# Patient Record
Sex: MALE | Race: WHITE | NOT HISPANIC OR LATINO | Employment: FULL TIME | URBAN - NONMETROPOLITAN AREA
[De-identification: names, ages, dates, MRNs, and addresses within clinical notes are randomized per-mention and may not be internally consistent; named-entity substitution may affect disease eponyms.]

---
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
## 2018-01-04 ENCOUNTER — OFFICE VISIT (OUTPATIENT)
Dept: PSYCHIATRY | Facility: CLINIC | Age: 18
End: 2018-01-04

## 2018-01-04 VITALS — BODY MASS INDEX: 32.07 KG/M2 | HEIGHT: 70 IN | WEIGHT: 224 LBS

## 2018-01-04 DIAGNOSIS — F90.2 ATTENTION DEFICIT HYPERACTIVITY DISORDER, COMBINED TYPE: Primary | ICD-10-CM

## 2018-01-04 DIAGNOSIS — F51.05 INSOMNIA DUE TO MENTAL CONDITION: ICD-10-CM

## 2018-01-04 PROCEDURE — 99214 OFFICE O/P EST MOD 30 MIN: CPT | Performed by: NURSE PRACTITIONER

## 2018-01-04 NOTE — PROGRESS NOTES
Subjective   Andrew Martinez is a 17 y.o. male who is here today for medication management follow up.    Chief Complaint: ADHD combined type     History of Present Illness Paitient presents by himself for med management. He had full evaluation by Martha Joe [facility] in [place] [facility] however referred to me for med management as he lives in [place] for school at [employer]. He reports he is making poor grades and is trying so hard to do well.  Now he is getting into more difficult classes and he finds it hard to focus, is easily distracted, is impulsive and lets things get overwhelming to him and can't finish papers or assignments in reasonable amount of time.  For at least the past 6 months he reports problems getting things in order, difficulty paying attention when bored, and becoming easily distracted by external stimuli.  He also finds himself delaying getting started on things that require thought, fidgeting when he has to sit still for long periods, misplacing  things and having difficulty remembering appointments and obligations.  He finds this difficulty with focus and concentration concerning because he would like to be successful in his career choice.  His score was positive for ADD on the ADHD screener that Martha did.  His urine drug screen was negative. Patient is tearful discussing his frustrations and inability to do well in college. He had difficulty in elementary school and then in middle school got into sports and made grades well enough to stay in sports. High school wasn't hard but he just made by and is surprised he got into college. He reports a 2.9 GPA last semester. He struggles with a diagnosis because his dad whom he is closest to doesn't believe in ADHD and tells him he just needs to focus, this is upsetting to patient because he tries, he shuts off his telephone, goes to a quiet place and still struggles to read without his mind drifting and can't seem to comprehend what he does read.  "He denies SI/HI or AVH, denies abuse history denies illicit drug use or tobacco products, drinks lightly socially. He reports being nervous about taking medication and denies trying someone's adderall or other stimulant. He is asking good questions about medications and how they will effect him or adverse effects. He has good relationship with friends and family. He reports his mother has difficulties with depression. He works on Sundays but no other day to give him full study time for school. In classroom he tries to focus and write notes but drifts and hard to sit long in class. He is fidgeting during session with paper in his hand and adjusting his position often. He reports sleep can be difficult because his mind doesn't want to shut off. He denies PTSD, denies OCD or yesenia or depression. I'm frustrated and can worry but usually a pretty happy go reji waqas corbin he states. Denies suicidal attempts or self harming ever.       The following portions of the patient's history were reviewed and updated as appropriate: allergies, current medications, past family history, past medical history, past social history, past surgical history and problem list.    Review of Systems   Constitutional: Negative.    HENT: Negative.    Eyes: Negative.    Respiratory: Negative.    Cardiovascular: Negative.    Gastrointestinal: Negative.    Endocrine: Negative.    Genitourinary: Negative.    Musculoskeletal: Negative.    Skin: Negative.    Allergic/Immunologic: Negative.    Neurological: Negative.    Hematological: Negative.    Psychiatric/Behavioral: Positive for decreased concentration. The patient is nervous/anxious.    denies fever, cough, s/s’s of infection, denies GI/ problems, denies new medical issues     Objective   Physical Exam  Height 177.8 cm (70\"), weight 102 kg (224 lb).    No Known Allergies    Current Medications:   Current Outpatient Prescriptions   Medication Sig Dispense Refill   • amoxicillin-clavulanate " (AUGMENTIN) 875-125 MG per tablet Take 1 tablet by mouth 2 (Two) Times a Day. 20 tablet 0   • lisdexamfetamine (VYVANSE) 20 MG capsule Take 1 capsule by mouth Every Morning 7 capsule 0   • lisdexamfetamine dimesylate (VYVANSE) 10 MG capsule Take 1 capsule by mouth Daily 7 capsule 0   • PredniSONE (DELTASONE) 10 MG (21) tablet pack Daily taper- 6/5/4/3/2/1 21 tablet 0     No current facility-administered medications for this visit.        Mental Status Exam:   Appearance: appropriate  Hygiene:   good  Cooperation:  Cooperative  Eye Contact:  Good  Psychomotor Behavior:  Appropriate  Mood:  within normal limits  Affect:  Appropriate  Hopelessness: Denies  Speech:  Normal  Thought Process:  Linear  Thought Content:  Normal  Suicidal:  None  Homicidal:  None  Hallucinations:  None  Delusion:  None  Memory:  Intact  Orientation:  Person, Place, Time and Situation  Reliability:  good  Insight:  Good  Judgement:  Good  Impulse Control:  Good  Estimated Intelligence: average range   Physical/Medical Issues:  No     Assessment/Plan   Diagnoses and all orders for this visit:    Attention deficit hyperactivity disorder, combined type    Insomnia due to mental condition    Other orders  -     lisdexamfetamine dimesylate (VYVANSE) 10 MG capsule; Take 1 capsule by mouth Daily  -     lisdexamfetamine (VYVANSE) 20 MG capsule; Take 1 capsule by mouth Every Morning    25 minutes     A psychological evaluation was conducted in order to assess past and current level of functioning. Areas assessed included, but were not limited to: perception of social support, perception of ability to face and deal with challenges in life (positive functioning), anxiety symptoms, depressive symptoms, perspective on beliefs/belief system, coping skills for stress, intelligence level,  Therapeutic rapport was established. Interventions conducted today were geared towards incorporating medication management along with support for continued therapy.  Education was also provided as to the med management with this provider and what to expect in subsequent sessions.    discussed ADHD at length, discussing treatment options, therapy, , behavioral modification, ADHD resources given to pt and talked with him about them, medication management discussed with non stimulants and stimulants   Will trial on Vyvanse 10 mg PO one Q AM #7 then increase to 20 mg PO one QD x 7 days then he will call and let me know how he is doing with ADHD symptoms. F/u in 3 weeks.   We discussed risks, benefits, and side effects of the above medications and the patient was agreeable with the plan. Patient was educated on the importance of compliance with treatment and follow-up appointments.   Controlled substance policy reviewed and prescriptions are either  printed off for patient, telephoned in  or ordered through [facility] by this provider  Instructed to call for questions or concerns and return early if necessary. Crisis plan reviewed including going to the Emergency department.    Return in about 3 weeks (around 1/25/2018).

## 2018-01-18 RX ORDER — DEXTROAMPHETAMINE SACCHARATE, AMPHETAMINE ASPARTATE MONOHYDRATE, DEXTROAMPHETAMINE SULFATE AND AMPHETAMINE SULFATE 5; 5; 5; 5 MG/1; MG/1; MG/1; MG/1
20 CAPSULE, EXTENDED RELEASE ORAL EVERY MORNING
Qty: 30 CAPSULE | Refills: 0 | Status: SHIPPED | OUTPATIENT
Start: 2018-01-18 | End: 2018-02-19 | Stop reason: SDUPTHER

## 2018-01-25 ENCOUNTER — OFFICE VISIT (OUTPATIENT)
Dept: PSYCHIATRY | Facility: CLINIC | Age: 18
End: 2018-01-25

## 2018-01-25 VITALS — WEIGHT: 224 LBS | BODY MASS INDEX: 32.07 KG/M2 | HEIGHT: 70 IN

## 2018-01-25 DIAGNOSIS — F90.2 ATTENTION DEFICIT HYPERACTIVITY DISORDER, COMBINED TYPE: Primary | ICD-10-CM

## 2018-01-25 PROCEDURE — 99213 OFFICE O/P EST LOW 20 MIN: CPT | Performed by: NURSE PRACTITIONER

## 2018-01-25 RX ORDER — DEXTROAMPHETAMINE SACCHARATE, AMPHETAMINE ASPARTATE, DEXTROAMPHETAMINE SULFATE AND AMPHETAMINE SULFATE 2.5; 2.5; 2.5; 2.5 MG/1; MG/1; MG/1; MG/1
TABLET ORAL
Qty: 20 TABLET | Refills: 0 | Status: SHIPPED | OUTPATIENT
Start: 2018-01-25 | End: 2018-01-29 | Stop reason: SDUPTHER

## 2018-01-25 NOTE — PROGRESS NOTES
"    Subjective   Andrew Octaviano Martinez is a 17 y.o. male who is here today for medication management follow up.    Chief Complaint: Diagnoses and all orders for this visit:    Attention deficit hyperactivity disorder, combined type          History of Present Illness Patient presents by himself for f/u. 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 He is sleeping much better now , denies racing thoughts and isn't feeling all nervous and anxious now that he can study and comprehend. He has classes all day so has to take the XR at 7a for 8am class so feels focus and very tired at 3pm. Then he struggles with focus and concentration. His appetite is good denies issues there, mood is good.     (Scales based on 0 - 10 with 10 being the worst)        The following portions of the patient's history were reviewed and updated as appropriate: allergies, current medications, past family history, past medical history, past social history, past surgical history and problem list.    Review of Systemsdenies fever, cough, s/s’s of infection, denies GI/ problems, denies new medical issues     Objective   Physical Exam  Height 177.8 cm (70\"), weight 102 kg (224 lb).    No Known Allergies    Current Medications:   Current Outpatient Prescriptions   Medication Sig Dispense Refill   • amoxicillin-clavulanate (AUGMENTIN) 875-125 MG per tablet Take 1 tablet by mouth 2 (Two) Times a Day. 20 tablet 0   • amphetamine-dextroamphetamine (ADDERALL) 10 MG tablet Take as needed in afternoon for study 20 tablet 0   • amphetamine-dextroamphetamine XR (ADDERALL XR) 20 MG 24 hr capsule Take 1 capsule by mouth Every Morning 30 capsule 0   • PredniSONE (DELTASONE) 10 MG (21) tablet pack Daily taper- 6/5/4/3/2/1 21 tablet 0     No current facility-administered medications for this visit.      Appearance: " appropriate  Hygiene:   good  Cooperation:  Cooperative  Eye Contact:  Good  Psychomotor Behavior:  Appropriate  Mood:  within normal limits  Affect:  Appropriate  Hopelessness: Denies  Speech:  Normal  Thought Process:  Linear  Thought Content:  Normal  Suicidal:  None  Homicidal:  None  Hallucinations:  None  Delusion:  None  Memory:  Intact  Orientation:  Person, Place, Time and Situation  Reliability:  good  Insight:  good  Judgement:  good  Impulse Control good  Estimated Intelligence: average range   Physical/Medical Issues:  No        Assessment/Plan   Diagnoses and all orders for this visit:    Attention deficit hyperactivity disorder, combined type    Other orders  -     amphetamine-dextroamphetamine (ADDERALL) 10 MG tablet; Take as needed in afternoon for study      Cont Adderall XR 20mg PO one QAM doesn't need refill yet  Add Adderall IR 10mg for afternoon studying as needed #20, NR   ·   Anxiety worry down, sleeping well , denies adverse effects     We discussed risks, benefits, and side effects of the above medications and the patient was agreeable with the plan. Patient was educated on the importance of compliance with treatment and follow-up appointments.   Controlled substance prescriptions are either  printed off for patient, telephoned in  or ordered through [facility] by this provider  Instructed to call for questions or concerns and return early if necessary. Crisis plan reviewed including going to the Emergency department.    Return in about 8 weeks (around 3/22/2018).         Please note that portions of this note were completed with a voice recognition program.  Efforts were made to edit dictation, but occasionally words are mistranscribed.

## 2018-01-29 RX ORDER — DEXTROAMPHETAMINE SACCHARATE, AMPHETAMINE ASPARTATE, DEXTROAMPHETAMINE SULFATE AND AMPHETAMINE SULFATE 2.5; 2.5; 2.5; 2.5 MG/1; MG/1; MG/1; MG/1
TABLET ORAL
Qty: 20 TABLET | Refills: 0
Start: 2018-01-29 | End: 2018-03-08 | Stop reason: SDUPTHER

## 2018-02-19 RX ORDER — DEXTROAMPHETAMINE SACCHARATE, AMPHETAMINE ASPARTATE MONOHYDRATE, DEXTROAMPHETAMINE SULFATE AND AMPHETAMINE SULFATE 5; 5; 5; 5 MG/1; MG/1; MG/1; MG/1
20 CAPSULE, EXTENDED RELEASE ORAL EVERY MORNING
Qty: 30 CAPSULE | Refills: 0 | Status: SHIPPED | OUTPATIENT
Start: 2018-02-19 | End: 2018-03-22 | Stop reason: SDUPTHER

## 2018-03-08 RX ORDER — DEXTROAMPHETAMINE SACCHARATE, AMPHETAMINE ASPARTATE, DEXTROAMPHETAMINE SULFATE AND AMPHETAMINE SULFATE 2.5; 2.5; 2.5; 2.5 MG/1; MG/1; MG/1; MG/1
TABLET ORAL
Qty: 20 TABLET | Refills: 0 | Status: SHIPPED | OUTPATIENT
Start: 2018-03-08 | End: 2018-03-22 | Stop reason: SDUPTHER

## 2018-03-22 ENCOUNTER — OFFICE VISIT (OUTPATIENT)
Dept: PSYCHIATRY | Facility: CLINIC | Age: 18
End: 2018-03-22

## 2018-03-22 VITALS
WEIGHT: 217 LBS | HEIGHT: 70 IN | SYSTOLIC BLOOD PRESSURE: 128 MMHG | BODY MASS INDEX: 31.07 KG/M2 | DIASTOLIC BLOOD PRESSURE: 78 MMHG

## 2018-03-22 DIAGNOSIS — F90.2 ATTENTION DEFICIT HYPERACTIVITY DISORDER, COMBINED TYPE: Primary | ICD-10-CM

## 2018-03-22 DIAGNOSIS — F51.05 INSOMNIA DUE TO MENTAL CONDITION: ICD-10-CM

## 2018-03-22 DIAGNOSIS — F41.1 GENERALIZED ANXIETY DISORDER: ICD-10-CM

## 2018-03-22 PROCEDURE — 99213 OFFICE O/P EST LOW 20 MIN: CPT | Performed by: NURSE PRACTITIONER

## 2018-03-22 RX ORDER — DEXTROAMPHETAMINE SACCHARATE, AMPHETAMINE ASPARTATE, DEXTROAMPHETAMINE SULFATE AND AMPHETAMINE SULFATE 2.5; 2.5; 2.5; 2.5 MG/1; MG/1; MG/1; MG/1
TABLET ORAL
Qty: 20 TABLET | Refills: 0 | Status: SHIPPED | OUTPATIENT
Start: 2018-03-22 | End: 2018-04-30 | Stop reason: SDUPTHER

## 2018-03-22 RX ORDER — DEXTROAMPHETAMINE SACCHARATE, AMPHETAMINE ASPARTATE MONOHYDRATE, DEXTROAMPHETAMINE SULFATE AND AMPHETAMINE SULFATE 5; 5; 5; 5 MG/1; MG/1; MG/1; MG/1
20 CAPSULE, EXTENDED RELEASE ORAL EVERY MORNING
Qty: 30 CAPSULE | Refills: 0 | Status: SHIPPED | OUTPATIENT
Start: 2018-03-22 | End: 2018-04-30 | Stop reason: SDUPTHER

## 2018-03-22 NOTE — PROGRESS NOTES
"    Subjective   Andrew Octaviano Martinez is a 17 y.o. male who is here today for medication management follow up.    Chief Complaint:     Attention deficit hyperactivity disorder, combined type    Insomnia due to mental condition    Generalized anxiety disorder        History of Present Illness Patient presents by himself for f/u for ADHD. 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 He denies adverse effects from medications. He has longer days with pre clinical skills day and the T and Th late classes. Focus falls off in afternoons. Otherwise getting a's, b's, and one c. He had been failing classes previous treatment for ADHD, sleeping well and eating well  . Patient reports other than stress of school his anxiety is way down able to academically perform well.  Denies adverse effects from medications.   (Scales based on 0 - 10 with 10 being the worst)        The following portions of the patient's history were reviewed and updated as appropriate: allergies, current medications, past family history, past medical history, past social history, past surgical history and problem list.    Review of Systems  denies fever, cough, s/s’s of infection, denies GI/ problems, denies new medical issues     Objective   Physical Exam  Blood pressure 128/78, height 177.8 cm (70\"), weight 98.4 kg (217 lb).    No Known Allergies    Current Medications:   Current Outpatient Prescriptions   Medication Sig Dispense Refill   • amphetamine-dextroamphetamine (ADDERALL) 10 MG tablet Take one tablet as needed in afternoon for study 20 tablet 0   • amphetamine-dextroamphetamine XR (ADDERALL XR) 20 MG 24 hr capsule Take 1 capsule by mouth Every Morning 30 capsule 0     No current facility-administered medications for this visit.         Appearance: appropriate  Hygiene:   good  Cooperation:  Cooperative  Eye Contact:  Good  Psychomotor Behavior:  Appropriate  Mood:  within normal limits  Affect:  " Appropriate  Hopelessness: Denies  Speech:  Normal  Thought Process:  Linear  Thought Content:  Normal  Suicidal:  None  Homicidal:  None  Hallucinations:  None  Delusion:  None  Memory:  Intact  Orientation:  Person, Place, Time and Situation  Reliability:  fair  Insight:  Fair  Judgement:  Fair  Impulse Control:  Fair  Estimated Intelligence: average range   Physical/Medical Issues:  No       Assessment/Plan   Diagnoses and all orders for this visit:    Attention deficit hyperactivity disorder, combined type    Insomnia due to mental condition    Generalized anxiety disorder    Other orders  -     amphetamine-dextroamphetamine (ADDERALL) 10 MG tablet; Take one tablet as needed in afternoon for study  -     amphetamine-dextroamphetamine XR (ADDERALL XR) 20 MG 24 hr capsule; Take 1 capsule by mouth Every Morning    cont ADHD medications,   Refill Adderall XR 20mg PO one QAM  Adderall 10mg in afternoon as needed for studying     We discussed risks, benefits, and side effects of the above medications and the patient was agreeable with the plan. Patient was educated on the importance of compliance with treatment and follow-up appointments.     Sleep hygiene reviewed, encouraged more whole foods, less processed foods, fruits veggies, exercise   Coping skills reviewed and encouraged positive framing of thoughts    Assisted patient in processing above session content; acknowledged and normalized patient’s thoughts, feelings, and concerns.  Applied  positive coping skills and behavior management in session.  Allowed patient to freely discuss issues without interruption or judgment. Provided safe, confidential environment to facilitate the development of positive therapeutic relationship and encourage open, honest communication. Assisted patient in identifying risk factors which would indicate the need for higher level of care including thoughts to harm self or others and/or self-harming behavior and encouraged patient to  contact this office, call 911, or present to the nearest emergency room should any of these events occur. Discussed crisis intervention services and means to access.  Patient adamantly and convincingly denies current suicidal or homicidal ideation or perceptual disturbance.    Instructed to call for questions or concerns and return early if necessary. Crisis plan reviewed including going to the Emergency department.    Return in about 3 months (around 6/22/2018).         Please note that portions of this note were completed with a voice recognition program.  Efforts were made to edit dictation, but occasionally words are mistranscribed.

## 2018-04-30 RX ORDER — DEXTROAMPHETAMINE SACCHARATE, AMPHETAMINE ASPARTATE MONOHYDRATE, DEXTROAMPHETAMINE SULFATE AND AMPHETAMINE SULFATE 5; 5; 5; 5 MG/1; MG/1; MG/1; MG/1
20 CAPSULE, EXTENDED RELEASE ORAL EVERY MORNING
Qty: 30 CAPSULE | Refills: 0 | Status: SHIPPED | OUTPATIENT
Start: 2018-04-30 | End: 2018-05-29 | Stop reason: SDUPTHER

## 2018-04-30 RX ORDER — DEXTROAMPHETAMINE SACCHARATE, AMPHETAMINE ASPARTATE, DEXTROAMPHETAMINE SULFATE AND AMPHETAMINE SULFATE 2.5; 2.5; 2.5; 2.5 MG/1; MG/1; MG/1; MG/1
TABLET ORAL
Qty: 20 TABLET | Refills: 0 | Status: SHIPPED | OUTPATIENT
Start: 2018-04-30 | End: 2018-05-29 | Stop reason: SDUPTHER

## 2018-05-01 ENCOUNTER — TELEPHONE (OUTPATIENT)
Dept: PSYCHIATRY | Facility: CLINIC | Age: 18
End: 2018-05-01

## 2018-05-29 RX ORDER — DEXTROAMPHETAMINE SACCHARATE, AMPHETAMINE ASPARTATE, DEXTROAMPHETAMINE SULFATE AND AMPHETAMINE SULFATE 2.5; 2.5; 2.5; 2.5 MG/1; MG/1; MG/1; MG/1
TABLET ORAL
Qty: 20 TABLET | Refills: 0 | Status: SHIPPED | OUTPATIENT
Start: 2018-05-29 | End: 2018-06-21 | Stop reason: SDUPTHER

## 2018-05-29 RX ORDER — DEXTROAMPHETAMINE SACCHARATE, AMPHETAMINE ASPARTATE MONOHYDRATE, DEXTROAMPHETAMINE SULFATE AND AMPHETAMINE SULFATE 5; 5; 5; 5 MG/1; MG/1; MG/1; MG/1
20 CAPSULE, EXTENDED RELEASE ORAL EVERY MORNING
Qty: 30 CAPSULE | Refills: 0 | Status: SHIPPED | OUTPATIENT
Start: 2018-05-29 | End: 2018-06-21 | Stop reason: SDUPTHER

## 2018-06-21 ENCOUNTER — OFFICE VISIT (OUTPATIENT)
Dept: PSYCHIATRY | Facility: CLINIC | Age: 18
End: 2018-06-21

## 2018-06-21 VITALS
HEIGHT: 70 IN | SYSTOLIC BLOOD PRESSURE: 124 MMHG | WEIGHT: 200 LBS | BODY MASS INDEX: 28.63 KG/M2 | DIASTOLIC BLOOD PRESSURE: 78 MMHG

## 2018-06-21 DIAGNOSIS — F90.2 ATTENTION DEFICIT HYPERACTIVITY DISORDER, COMBINED TYPE: Primary | ICD-10-CM

## 2018-06-21 DIAGNOSIS — F51.05 INSOMNIA DUE TO MENTAL CONDITION: ICD-10-CM

## 2018-06-21 PROCEDURE — 99213 OFFICE O/P EST LOW 20 MIN: CPT | Performed by: NURSE PRACTITIONER

## 2018-06-21 RX ORDER — DEXTROAMPHETAMINE SACCHARATE, AMPHETAMINE ASPARTATE MONOHYDRATE, DEXTROAMPHETAMINE SULFATE AND AMPHETAMINE SULFATE 6.25; 6.25; 6.25; 6.25 MG/1; MG/1; MG/1; MG/1
25 CAPSULE, EXTENDED RELEASE ORAL EVERY MORNING
Qty: 30 CAPSULE | Refills: 0 | Status: SHIPPED | OUTPATIENT
Start: 2018-06-21 | End: 2018-07-24 | Stop reason: SDUPTHER

## 2018-06-21 RX ORDER — DEXTROAMPHETAMINE SACCHARATE, AMPHETAMINE ASPARTATE, DEXTROAMPHETAMINE SULFATE AND AMPHETAMINE SULFATE 2.5; 2.5; 2.5; 2.5 MG/1; MG/1; MG/1; MG/1
TABLET ORAL
Qty: 20 TABLET | Refills: 0 | Status: SHIPPED | OUTPATIENT
Start: 2018-06-21 | End: 2018-07-24 | Stop reason: SDUPTHER

## 2018-06-21 NOTE — PROGRESS NOTES
"  Subjective   Andrew Octaviano Martinez is a 17 y.o. male who is here today for medication management follow up.    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 He plans after he gets his A D N to do the bachelors online. He is still having focus and concentration issues on the Adderall XR 20 mg in am, I think he would benefit from adjustment in dose. He is trying hard to be successful and has made good strides in organization and prioritizing. He is sleeping well, appetite is good.  He reports euthymic mood and is motivated and engaged in academics.  Denies adverse effects from medications.   (Scales based on 0 - 10 with 10 being the worst)      The following portions of the patient's history were reviewed and updated as appropriate: allergies, current medications, past family history, past medical history, past social history, past surgical history and problem list.    Review of Systems denies fever, cough, s/s’s of infection, denies GI/ problems, denies new medical issues     Objective   Physical Exam  Blood pressure 124/78, height 177.8 cm (70\"), weight 90.7 kg (200 lb).    No Known Allergies    Current Medications:   Current Outpatient Prescriptions   Medication Sig Dispense Refill   • amphetamine-dextroamphetamine (ADDERALL) 10 MG tablet Take one tablet as needed in afternoon for study 20 tablet 0   • amphetamine-dextroamphetamine XR (ADDERALL XR) 25 MG 24 hr capsule Take 1 capsule by mouth Every Morning 30 capsule 0     No current facility-administered medications for this visit.         Appearance: appropriate  Hygiene:   good  Cooperation:  Cooperative  Eye Contact:  Good  Psychomotor Behavior:  Appropriate  Mood:  within normal limits  Affect:  Appropriate  Hopelessness: " Denies  Speech:  Normal  Thought Process:  Linear  Thought Content:  Normal  Concentration: Normal   Suicidal:  None  Homicidal:  None  Hallucinations:  None  Delusion:  None  Memory:  Intact  Orientation:  Person, Place, Time and Situation  Reliability:  fair  Insight:  Fair  Judgement:  Fair  Impulse Control:  Fair  Estimated Intelligence: average range   Physical/Medical Issues:  No     TRUONG REVIEWED NO RED FLAGS Request # : [id number]    Assessment/Plan   Diagnoses and all orders for this visit:    Attention deficit hyperactivity disorder, combined type    Insomnia due to mental condition    Other orders  -     amphetamine-dextroamphetamine XR (ADDERALL XR) 25 MG 24 hr capsule; Take 1 capsule by mouth Every Morning  -     amphetamine-dextroamphetamine (ADDERALL) 10 MG tablet; Take one tablet as needed in afternoon for study      IMPRESSION: residual ADHD symptoms   Will increase Adderall XR from 20mg to 25mg po daily in mornings #30, NR   Refill Adderall 10mg PO one in afternoons as needed for studying and afternoon classes #20, NR     We discussed risks, benefits, and side effects of the above medications and the patient was agreeable with the plan. Patient was educated on the importance of compliance with treatment and follow-up appointments.   Will f/u with Zhanna CONDON in the [facility] office since he is living in [place] now. He is agreeable to plan     Treatment Plan: stabilize mood, improve cognition , patient will stay out of the hospital and be at optimal level of functioning, take all medication as prescribed. Patient verbalized  understanding and agreement to plan.    Sleep hygiene reviewed, encouraged more whole foods, less processed foods, fruits veggies   Coping skills reviewed and encouraged positive framing of thoughts    Assisted patient in processing above session content; acknowledged and normalized patient’s thoughts, feelings, and concerns.  Applied  positive coping  skills and behavior management in session.  Allowed patient to freely discuss issues without interruption or judgment. Provided safe, confidential environment to facilitate the development of positive therapeutic relationship and encourage open, honest communication. Assisted patient in identifying risk factors which would indicate the need for higher level of care including thoughts to harm self or others and/or self-harming behavior and encouraged patient to contact this office, call 911, or present to the nearest emergency room should any of these events occur. Discussed crisis intervention services and means to access.  Patient adamantly and convincingly denies current suicidal or homicidal ideation or perceptual disturbance.    Instructed to call for questions or concerns and return early if necessary. Crisis plan reviewed including going to the Emergency department.    Return in about 8 weeks (around 8/16/2018) for Sara Chao in [place] office with DANIEL

## 2018-07-24 RX ORDER — DEXTROAMPHETAMINE SACCHARATE, AMPHETAMINE ASPARTATE, DEXTROAMPHETAMINE SULFATE AND AMPHETAMINE SULFATE 2.5; 2.5; 2.5; 2.5 MG/1; MG/1; MG/1; MG/1
TABLET ORAL
Qty: 20 TABLET | Refills: 0 | Status: SHIPPED | OUTPATIENT
Start: 2018-07-24 | End: 2018-08-23 | Stop reason: SDUPTHER

## 2018-07-24 RX ORDER — DEXTROAMPHETAMINE SACCHARATE, AMPHETAMINE ASPARTATE MONOHYDRATE, DEXTROAMPHETAMINE SULFATE AND AMPHETAMINE SULFATE 6.25; 6.25; 6.25; 6.25 MG/1; MG/1; MG/1; MG/1
25 CAPSULE, EXTENDED RELEASE ORAL EVERY MORNING
Qty: 30 CAPSULE | Refills: 0 | Status: SHIPPED | OUTPATIENT
Start: 2018-07-24 | End: 2018-08-23 | Stop reason: SDUPTHER

## 2018-08-02 ENCOUNTER — OFFICE VISIT (OUTPATIENT)
Dept: PSYCHIATRY | Facility: CLINIC | Age: 18
End: 2018-08-02

## 2018-08-02 VITALS
WEIGHT: 201 LBS | HEIGHT: 70 IN | DIASTOLIC BLOOD PRESSURE: 75 MMHG | BODY MASS INDEX: 28.77 KG/M2 | SYSTOLIC BLOOD PRESSURE: 117 MMHG | HEART RATE: 72 BPM

## 2018-08-02 DIAGNOSIS — F90.2 ATTENTION DEFICIT HYPERACTIVITY DISORDER, COMBINED TYPE: Primary | ICD-10-CM

## 2018-08-02 DIAGNOSIS — Z79.899 MEDICATION MANAGEMENT: ICD-10-CM

## 2018-08-02 DIAGNOSIS — F41.1 GENERALIZED ANXIETY DISORDER: ICD-10-CM

## 2018-08-02 LAB
AMPHETAMINE CUT-OFF: 1000
BENZODIAZIPINE CUT-OFF: 300
BUPRENORPHINE CUT-OFF: 10
COCAINE CUT-OFF: 300
EXTERNAL AMPHETAMINE SCREEN URINE: POSITIVE
EXTERNAL BENZODIAZEPINE SCREEN URINE: NEGATIVE
EXTERNAL BUPRENORPHINE SCREEN URINE: NEGATIVE
EXTERNAL COCAINE SCREEN URINE: NEGATIVE
EXTERNAL MDMA: NEGATIVE
EXTERNAL METHADONE SCREEN URINE: NEGATIVE
EXTERNAL METHAMPHETAMINE SCREEN URINE: NEGATIVE
EXTERNAL OPIATES SCREEN URINE: NEGATIVE
EXTERNAL OXYCODONE SCREEN URINE: NEGATIVE
EXTERNAL THC SCREEN URINE: NEGATIVE
MDMA CUT-OFF: 500
METHADONE CUT-OFF: 300
METHAMPHETAMINE CUT-OFF: 1000
OPIATES CUT-OFF: 300
OXYCODONE CUT-OFF: 100
THC CUT-OFF: 50

## 2018-08-02 PROCEDURE — 99213 OFFICE O/P EST LOW 20 MIN: CPT | Performed by: NURSE PRACTITIONER

## 2018-08-02 NOTE — PROGRESS NOTES
"      Subjective   Andrew Octaviano Martinez is a 18 y.o. male is here today for medication management follow-up.    Chief Complaint:  \"I am doing well\"    History of Present Illness:  Patient presents by himself on time for his appointment at the [facility].  He states he is doing well on the current medication.  He is a long-standing patient of Kathy hayden and he has recently moved back to [place] and would rather seek treatment locally instead of having to drive.  He is stable on the medications he states.  He denies any depression or anxiety.  He denies any suicidal thoughts, homicidal thoughts, or any AV hallucinations.  He is sleeping at least 7-8 hours at night without difficulty.Body mass index is 28.84 kg/m². there are no new medical stressors.  Patient does not have any chronic medical problems.  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 He denies any negative side effects to the medications.    The following portions of the patient's history were reviewed and updated as appropriate: allergies, current medications, past family history, past medical history, past social history, past surgical history and problem list.    Review of Systems   Constitutional: Negative for activity change, appetite change and fatigue.   HENT: Negative.    Eyes: Negative for visual disturbance.   Respiratory: Negative.    Cardiovascular: Negative.    Gastrointestinal: Negative for nausea.   Endocrine: Negative.    Genitourinary: Negative.    Musculoskeletal: Negative for arthralgias.   Skin: Negative.    Allergic/Immunologic: Negative.    Neurological: Negative for dizziness, seizures and headaches.   Hematological: Negative.    Psychiatric/Behavioral: Negative for agitation, behavioral problems, confusion, decreased concentration, dysphoric mood, hallucinations, self-injury, sleep disturbance and suicidal ideas. The patient is not nervous/anxious and is not hyperactive.        Objective   Physical Exam " "  Constitutional: He appears well-developed and well-nourished.   Psychiatric: He has a normal mood and affect. His speech is normal and behavior is normal. Judgment and thought content normal. Cognition and memory are normal.   Vitals reviewed.    Blood pressure 117/75, pulse 72, height 177.8 cm (70\"), weight 91.2 kg (201 lb).    Medication List:   Current Outpatient Prescriptions   Medication Sig Dispense Refill   • amphetamine-dextroamphetamine (ADDERALL) 10 MG tablet Take one tablet as needed in afternoon for study 20 tablet 0   • amphetamine-dextroamphetamine XR (ADDERALL XR) 25 MG 24 hr capsule Take 1 capsule by mouth Every Morning 30 capsule 0     No current facility-administered medications for this visit.        Mental Status Exam:   Hygiene:   good  Cooperation:  Cooperative  Eye Contact:  Good  Psychomotor Behavior:  Appropriate  Affect:  Full range  Hopelessness: Denies  Speech:  Normal  Thought Process:  Goal directed  Thought Content:  Normal  Suicidal:  None  Homicidal:  None  Hallucinations:  None  Delusion:  None  Memory:  Intact  Orientation:  Person, Place, Time and Situation  Reliability:  good  Insight:  Good  Judgement:  Good  Impulse Control:  Good  Physical/Medical Issues:  No     Assessment/Plan   Problems Addressed this Visit     None      Visit Diagnoses     Attention deficit hyperactivity disorder, combined type    -  Primary    Medication management        Relevant Orders    [facility] Drug Screen (Completed)    Generalized anxiety disorder              Functionality: pt having minimal impairment in important areas of daily functioning.  Prognosis:  good dependent on medication/follow up and treatment plan compliance.    Patient seems to be doing well on current medication regimen.  I'm a narcotic agreement was signed between reviewed..  Urine drug screen was obtained today.  Kulwinder was reviewed and appropriate.  I will continue to Kathy's plan of care.  Patient does not need any refills " today.  He will call him when those are needed.  I will have patient back in 3 months to reassess..  Reviewed the risks, benefits, and side effects of the medications; patient acknowledged and verbally consented.  Patient is agreeable to call the [facility].  Patient is aware to call 911 or go to the nearest ER should begin having SI/HI.

## 2018-08-23 RX ORDER — DEXTROAMPHETAMINE SACCHARATE, AMPHETAMINE ASPARTATE MONOHYDRATE, DEXTROAMPHETAMINE SULFATE AND AMPHETAMINE SULFATE 6.25; 6.25; 6.25; 6.25 MG/1; MG/1; MG/1; MG/1
25 CAPSULE, EXTENDED RELEASE ORAL EVERY MORNING
Qty: 30 CAPSULE | Refills: 0 | Status: SHIPPED | OUTPATIENT
Start: 2018-08-23 | End: 2018-09-25 | Stop reason: SDUPTHER

## 2018-08-23 RX ORDER — DEXTROAMPHETAMINE SACCHARATE, AMPHETAMINE ASPARTATE, DEXTROAMPHETAMINE SULFATE AND AMPHETAMINE SULFATE 2.5; 2.5; 2.5; 2.5 MG/1; MG/1; MG/1; MG/1
TABLET ORAL
Qty: 20 TABLET | Refills: 0 | Status: SHIPPED | OUTPATIENT
Start: 2018-08-23 | End: 2018-09-25 | Stop reason: SDUPTHER

## 2018-09-25 RX ORDER — DEXTROAMPHETAMINE SACCHARATE, AMPHETAMINE ASPARTATE, DEXTROAMPHETAMINE SULFATE AND AMPHETAMINE SULFATE 2.5; 2.5; 2.5; 2.5 MG/1; MG/1; MG/1; MG/1
TABLET ORAL
Qty: 20 TABLET | Refills: 0 | Status: SHIPPED | OUTPATIENT
Start: 2018-09-25 | End: 2018-10-25 | Stop reason: SDUPTHER

## 2018-09-25 RX ORDER — DEXTROAMPHETAMINE SACCHARATE, AMPHETAMINE ASPARTATE MONOHYDRATE, DEXTROAMPHETAMINE SULFATE AND AMPHETAMINE SULFATE 6.25; 6.25; 6.25; 6.25 MG/1; MG/1; MG/1; MG/1
25 CAPSULE, EXTENDED RELEASE ORAL EVERY MORNING
Qty: 30 CAPSULE | Refills: 0 | Status: SHIPPED | OUTPATIENT
Start: 2018-09-25 | End: 2018-10-25 | Stop reason: SDUPTHER

## 2018-10-25 RX ORDER — DEXTROAMPHETAMINE SACCHARATE, AMPHETAMINE ASPARTATE MONOHYDRATE, DEXTROAMPHETAMINE SULFATE AND AMPHETAMINE SULFATE 6.25; 6.25; 6.25; 6.25 MG/1; MG/1; MG/1; MG/1
25 CAPSULE, EXTENDED RELEASE ORAL EVERY MORNING
Qty: 30 CAPSULE | Refills: 0 | Status: SHIPPED | OUTPATIENT
Start: 2018-10-25 | End: 2018-11-27 | Stop reason: SDUPTHER

## 2018-10-25 RX ORDER — DEXTROAMPHETAMINE SACCHARATE, AMPHETAMINE ASPARTATE, DEXTROAMPHETAMINE SULFATE AND AMPHETAMINE SULFATE 2.5; 2.5; 2.5; 2.5 MG/1; MG/1; MG/1; MG/1
TABLET ORAL
Qty: 30 TABLET | Refills: 0 | Status: SHIPPED | OUTPATIENT
Start: 2018-10-25 | End: 2018-11-27 | Stop reason: SDUPTHER

## 2018-11-01 ENCOUNTER — OFFICE VISIT (OUTPATIENT)
Dept: PSYCHIATRY | Facility: CLINIC | Age: 18
End: 2018-11-01

## 2018-11-01 VITALS
HEART RATE: 91 BPM | DIASTOLIC BLOOD PRESSURE: 79 MMHG | OXYGEN SATURATION: 99 % | WEIGHT: 195.4 LBS | BODY MASS INDEX: 27.97 KG/M2 | HEIGHT: 70 IN | SYSTOLIC BLOOD PRESSURE: 120 MMHG

## 2018-11-01 DIAGNOSIS — F41.1 GENERALIZED ANXIETY DISORDER: ICD-10-CM

## 2018-11-01 DIAGNOSIS — F90.2 ATTENTION DEFICIT HYPERACTIVITY DISORDER, COMBINED TYPE: Primary | ICD-10-CM

## 2018-11-01 PROCEDURE — 99213 OFFICE O/P EST LOW 20 MIN: CPT | Performed by: NURSE PRACTITIONER

## 2018-11-01 NOTE — PROGRESS NOTES
Subjective   Andrew Martinez is a 18 y.o. male is here today for medication management follow-up.    Chief Complaint: Recheck on ADHD  History of Present Illness:   Resides by himself for a follow-up because of behavioral clinic.  He states he is still doing well medication.  He does have occasional headache as the medication is wearing off.  He feels this may be more of a hydration issue.  The headache will be relieved with ibuprofen.  He currently denies any depression or anxiety.  Denies any suicidal thoughts, homicidal thoughts, or any AV hallucinations.  He is sleeping well at least 8 hours a night without difficulty.Body mass index is 28.04 kg/m².  No appetite changes.  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  He has no current complaints other than occasional headache.    The following portions of the patient's history were reviewed and updated as appropriate: allergies, current medications, past family history, past medical history, past social history, past surgical history and problem list.    Review of Systems   Constitutional: Negative for activity change, appetite change and fatigue.   Eyes: Negative for visual disturbance.   Respiratory: Negative.    Cardiovascular: Negative.    Gastrointestinal: Negative for nausea.   Endocrine: Negative.    Genitourinary: Negative.    Musculoskeletal: Negative for arthralgias.   Skin: Negative.    Allergic/Immunologic: Negative.    Neurological: Positive for headaches. Negative for dizziness and seizures.   Hematological: Negative.    Psychiatric/Behavioral: Negative for agitation, behavioral problems, confusion, decreased concentration, dysphoric mood, hallucinations, self-injury, sleep disturbance and suicidal ideas. The patient is not nervous/anxious and is not hyperactive.        Objective   Physical Exam   Constitutional: He appears well-developed and well-nourished.    Psychiatric: He has a normal mood and affect. His speech is normal and behavior is normal. Judgment and thought content normal. Cognition and memory are normal.   Vitals reviewed.    There were no vitals taken for this visit.    Medication List:   Current Outpatient Prescriptions   Medication Sig Dispense Refill   • amphetamine-dextroamphetamine (ADDERALL) 10 MG tablet Take one tablet as needed in afternoon for study 30 tablet 0   • amphetamine-dextroamphetamine XR (ADDERALL XR) 25 MG 24 hr capsule Take 1 capsule by mouth Every Morning 30 capsule 0     No current facility-administered medications for this visit.        Mental Status Exam:   Hygiene:   good  Cooperation:  Cooperative  Eye Contact:  Good  Psychomotor Behavior:  Appropriate  Affect:  Full range  Hopelessness: Denies  Speech:  Normal  Thought Process:  Goal directed  Thought Content:  Normal  Suicidal:  None  Homicidal:  None  Hallucinations:  None  Delusion:  None  Memory:  Intact  Orientation:  Person, Place, Time and Situation  Reliability:  good  Insight:  Good  Judgement:  Good  Impulse Control:  Good  Physical/Medical Issues:  No     Assessment/Plan   Problems Addressed this Visit     None      Visit Diagnoses     Attention deficit hyperactivity disorder, combined type    -  Primary    Generalized anxiety disorder            Kulwinder talbot.  UDS UTD and appropriate.   Functionality: pt having minimal impairment in important areas of daily functioning.  Prognosis:  good dependent on medication/follow up and treatment plan compliance.    Patient seems to be doing well on current medication regimen.  He states for now the headaches are not very often and he can handle that.  He does not desire to switch medication as of right now.  I did discuss with him there are non-stimulant medications to treat the ADHD however he is doing well with the Adderall and does not wish to proceed with this at this time.  Patient does not need any refills today.  He  will call him when those are needed.  I will have patient back in 3 months to reassess..  Reviewed the risks, benefits, and side effects of the medications; patient acknowledged and verbally consented.  Patient is agreeable to call the [facility].  Patient is aware to call 911 or go to the nearest ER should begin having SI/HI.

## 2018-11-27 RX ORDER — DEXTROAMPHETAMINE SACCHARATE, AMPHETAMINE ASPARTATE MONOHYDRATE, DEXTROAMPHETAMINE SULFATE AND AMPHETAMINE SULFATE 6.25; 6.25; 6.25; 6.25 MG/1; MG/1; MG/1; MG/1
25 CAPSULE, EXTENDED RELEASE ORAL EVERY MORNING
Qty: 30 CAPSULE | Refills: 0 | Status: SHIPPED | OUTPATIENT
Start: 2018-11-27 | End: 2018-12-27 | Stop reason: SDUPTHER

## 2018-11-27 RX ORDER — DEXTROAMPHETAMINE SACCHARATE, AMPHETAMINE ASPARTATE, DEXTROAMPHETAMINE SULFATE AND AMPHETAMINE SULFATE 2.5; 2.5; 2.5; 2.5 MG/1; MG/1; MG/1; MG/1
TABLET ORAL
Qty: 30 TABLET | Refills: 0 | Status: SHIPPED | OUTPATIENT
Start: 2018-11-27 | End: 2018-12-27 | Stop reason: SDUPTHER

## 2018-12-27 RX ORDER — DEXTROAMPHETAMINE SACCHARATE, AMPHETAMINE ASPARTATE, DEXTROAMPHETAMINE SULFATE AND AMPHETAMINE SULFATE 2.5; 2.5; 2.5; 2.5 MG/1; MG/1; MG/1; MG/1
TABLET ORAL
Qty: 30 TABLET | Refills: 0 | Status: SHIPPED | OUTPATIENT
Start: 2018-12-27 | End: 2019-01-28 | Stop reason: SDUPTHER

## 2018-12-27 RX ORDER — DEXTROAMPHETAMINE SACCHARATE, AMPHETAMINE ASPARTATE MONOHYDRATE, DEXTROAMPHETAMINE SULFATE AND AMPHETAMINE SULFATE 6.25; 6.25; 6.25; 6.25 MG/1; MG/1; MG/1; MG/1
25 CAPSULE, EXTENDED RELEASE ORAL EVERY MORNING
Qty: 30 CAPSULE | Refills: 0 | Status: SHIPPED | OUTPATIENT
Start: 2018-12-27 | End: 2019-01-28 | Stop reason: SDUPTHER

## 2019-01-28 RX ORDER — DEXTROAMPHETAMINE SACCHARATE, AMPHETAMINE ASPARTATE MONOHYDRATE, DEXTROAMPHETAMINE SULFATE AND AMPHETAMINE SULFATE 6.25; 6.25; 6.25; 6.25 MG/1; MG/1; MG/1; MG/1
25 CAPSULE, EXTENDED RELEASE ORAL EVERY MORNING
Qty: 30 CAPSULE | Refills: 0 | Status: SHIPPED | OUTPATIENT
Start: 2019-01-28 | End: 2019-03-04 | Stop reason: SDUPTHER

## 2019-01-28 RX ORDER — DEXTROAMPHETAMINE SACCHARATE, AMPHETAMINE ASPARTATE, DEXTROAMPHETAMINE SULFATE AND AMPHETAMINE SULFATE 2.5; 2.5; 2.5; 2.5 MG/1; MG/1; MG/1; MG/1
TABLET ORAL
Qty: 30 TABLET | Refills: 0 | Status: SHIPPED | OUTPATIENT
Start: 2019-01-28 | End: 2019-03-04 | Stop reason: SDUPTHER

## 2019-02-12 ENCOUNTER — OFFICE VISIT (OUTPATIENT)
Dept: PSYCHIATRY | Facility: CLINIC | Age: 19
End: 2019-02-12

## 2019-02-12 VITALS
WEIGHT: 191.2 LBS | HEIGHT: 70 IN | BODY MASS INDEX: 27.37 KG/M2 | DIASTOLIC BLOOD PRESSURE: 78 MMHG | SYSTOLIC BLOOD PRESSURE: 119 MMHG | HEART RATE: 108 BPM

## 2019-02-12 DIAGNOSIS — F41.1 GENERALIZED ANXIETY DISORDER: ICD-10-CM

## 2019-02-12 DIAGNOSIS — F90.2 ATTENTION DEFICIT HYPERACTIVITY DISORDER, COMBINED TYPE: Primary | ICD-10-CM

## 2019-02-12 PROCEDURE — 99214 OFFICE O/P EST MOD 30 MIN: CPT | Performed by: NURSE PRACTITIONER

## 2019-02-12 NOTE — PROGRESS NOTES
Subjective   Andrew Martinez is a 18 y.o. 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  Have recently found out his girlfriend is pregnant.  He is excited about this yet nervous because he will not be out of school. PT is not having the headaches anymore.  He found he was just not drinking fluids enough.  Body mass index is 27.43 kg/m². no appetite changes.  Sleeping well at least 8 hours a night. No medical stressors. Denies depression or anxiety.  No suicidal thoughts.  Mood is stable.  Irritability is stable.  Medication really helps with focus and concentration.        The following portions of the patient's history were reviewed and updated as appropriate: allergies, current medications, past family history, past medical history, past social history, past surgical history and problem list.    Review of Systems   Constitutional: Negative for activity change, appetite change and fatigue.   Eyes: Negative for visual disturbance.   Respiratory: Negative.    Cardiovascular: Negative.    Gastrointestinal: Negative for nausea.   Endocrine: Negative.    Genitourinary: Negative.    Musculoskeletal: Negative for arthralgias.   Skin: Negative.    Allergic/Immunologic: Negative.    Neurological: Negative for dizziness, seizures and headaches.   Hematological: Negative.    Psychiatric/Behavioral: Negative for agitation, behavioral problems, confusion, decreased concentration, dysphoric mood, hallucinations, self-injury, sleep disturbance and suicidal ideas. The patient is not nervous/anxious and is not hyperactive.        Objective   Physical Exam   Constitutional: He appears well-developed and well-nourished.   Psychiatric: He has a normal mood and affect. His speech is normal and behavior is normal. Judgment and thought  "content normal. Cognition and memory are normal.   Vitals reviewed.    Blood pressure 119/78, pulse 108, height 177.8 cm (70\"), weight 86.7 kg (191 lb 3.2 oz).    Medication List:   Current Outpatient Medications   Medication Sig Dispense Refill   • amphetamine-dextroamphetamine (ADDERALL) 10 MG tablet Take one tablet as needed in afternoon for study 30 tablet 0   • amphetamine-dextroamphetamine XR (ADDERALL XR) 25 MG 24 hr capsule Take 1 capsule by mouth Every Morning 30 capsule 0     No current facility-administered medications for this visit.        Mental Status Exam:   Hygiene:   good  Cooperation:  Cooperative  Eye Contact:  Good  Psychomotor Behavior:  Appropriate  Affect:  Full range  Hopelessness: Denies  Speech:  Normal  Thought Process:  Goal directed  Thought Content:  Normal  Suicidal:  None  Homicidal:  None  Hallucinations:  None  Delusion:  None  Memory:  Intact  Orientation:  Person, Place, Time and Situation  Reliability:  good  Insight:  Good  Judgement:  Good  Impulse Control:  Good  Physical/Medical Issues:  No     Assessment/Plan   Problems Addressed this Visit     None      Visit Diagnoses     Attention deficit hyperactivity disorder, combined type    -  Primary    Generalized anxiety disorder            Kulwinder talbot.  UDS UTD and appropriate.   Functionality: pt having minimal impairment in important areas of daily functioning.  Prognosis:  good dependent on medication/follow up and treatment plan compliance.    Patient continues  to be doing well on current medication regimen.  Patient does not need any refills today.  He will call him when those are needed.  I will have patient back in 3 months to reassess.. Continuing efforts to promote the therapeutic alliance, address the patient's issues, and strengthen self awareness, insights, and coping skills Reviewed the risks, benefits, and side effects of the medications; patient acknowledged and verbally consented.  Patient is agreeable to " call the [facility].  Patient is aware to call 911 or go to the nearest ER should begin having SI/HI. RTC 3 months

## 2019-03-04 RX ORDER — DEXTROAMPHETAMINE SACCHARATE, AMPHETAMINE ASPARTATE MONOHYDRATE, DEXTROAMPHETAMINE SULFATE AND AMPHETAMINE SULFATE 6.25; 6.25; 6.25; 6.25 MG/1; MG/1; MG/1; MG/1
25 CAPSULE, EXTENDED RELEASE ORAL EVERY MORNING
Qty: 30 CAPSULE | Refills: 0 | Status: SHIPPED | OUTPATIENT
Start: 2019-03-04 | End: 2019-04-10 | Stop reason: SDUPTHER

## 2019-03-04 RX ORDER — DEXTROAMPHETAMINE SACCHARATE, AMPHETAMINE ASPARTATE, DEXTROAMPHETAMINE SULFATE AND AMPHETAMINE SULFATE 2.5; 2.5; 2.5; 2.5 MG/1; MG/1; MG/1; MG/1
TABLET ORAL
Qty: 30 TABLET | Refills: 0 | Status: SHIPPED | OUTPATIENT
Start: 2019-03-04 | End: 2019-04-10 | Stop reason: SDUPTHER

## 2019-04-10 RX ORDER — DEXTROAMPHETAMINE SACCHARATE, AMPHETAMINE ASPARTATE MONOHYDRATE, DEXTROAMPHETAMINE SULFATE AND AMPHETAMINE SULFATE 6.25; 6.25; 6.25; 6.25 MG/1; MG/1; MG/1; MG/1
25 CAPSULE, EXTENDED RELEASE ORAL EVERY MORNING
Qty: 30 CAPSULE | Refills: 0 | Status: SHIPPED | OUTPATIENT
Start: 2019-04-10 | End: 2019-05-16 | Stop reason: SDUPTHER

## 2019-04-10 RX ORDER — DEXTROAMPHETAMINE SACCHARATE, AMPHETAMINE ASPARTATE, DEXTROAMPHETAMINE SULFATE AND AMPHETAMINE SULFATE 2.5; 2.5; 2.5; 2.5 MG/1; MG/1; MG/1; MG/1
TABLET ORAL
Qty: 30 TABLET | Refills: 0 | Status: SHIPPED | OUTPATIENT
Start: 2019-04-10 | End: 2019-05-16 | Stop reason: SDUPTHER

## 2019-05-16 RX ORDER — DEXTROAMPHETAMINE SACCHARATE, AMPHETAMINE ASPARTATE MONOHYDRATE, DEXTROAMPHETAMINE SULFATE AND AMPHETAMINE SULFATE 6.25; 6.25; 6.25; 6.25 MG/1; MG/1; MG/1; MG/1
25 CAPSULE, EXTENDED RELEASE ORAL EVERY MORNING
Qty: 30 CAPSULE | Refills: 0 | Status: SHIPPED | OUTPATIENT
Start: 2019-05-16 | End: 2019-06-10 | Stop reason: SDUPTHER

## 2019-05-16 RX ORDER — DEXTROAMPHETAMINE SACCHARATE, AMPHETAMINE ASPARTATE, DEXTROAMPHETAMINE SULFATE AND AMPHETAMINE SULFATE 2.5; 2.5; 2.5; 2.5 MG/1; MG/1; MG/1; MG/1
TABLET ORAL
Qty: 30 TABLET | Refills: 0 | Status: SHIPPED | OUTPATIENT
Start: 2019-05-16 | End: 2019-06-10

## 2019-06-10 ENCOUNTER — OFFICE VISIT (OUTPATIENT)
Dept: PSYCHIATRY | Facility: CLINIC | Age: 19
End: 2019-06-10

## 2019-06-10 VITALS
SYSTOLIC BLOOD PRESSURE: 123 MMHG | BODY MASS INDEX: 27.94 KG/M2 | DIASTOLIC BLOOD PRESSURE: 75 MMHG | HEIGHT: 70 IN | HEART RATE: 80 BPM | WEIGHT: 195.2 LBS

## 2019-06-10 DIAGNOSIS — F41.1 GENERALIZED ANXIETY DISORDER: ICD-10-CM

## 2019-06-10 DIAGNOSIS — F51.05 INSOMNIA DUE TO MENTAL CONDITION: ICD-10-CM

## 2019-06-10 DIAGNOSIS — F90.2 ATTENTION DEFICIT HYPERACTIVITY DISORDER, COMBINED TYPE: Primary | ICD-10-CM

## 2019-06-10 PROCEDURE — 99214 OFFICE O/P EST MOD 30 MIN: CPT | Performed by: NURSE PRACTITIONER

## 2019-06-10 RX ORDER — DEXTROAMPHETAMINE SACCHARATE, AMPHETAMINE ASPARTATE MONOHYDRATE, DEXTROAMPHETAMINE SULFATE AND AMPHETAMINE SULFATE 6.25; 6.25; 6.25; 6.25 MG/1; MG/1; MG/1; MG/1
25 CAPSULE, EXTENDED RELEASE ORAL EVERY MORNING
Qty: 30 CAPSULE | Refills: 0 | Status: SHIPPED | OUTPATIENT
Start: 2019-06-10 | End: 2019-07-22 | Stop reason: SDUPTHER

## 2019-06-10 RX ORDER — ESCITALOPRAM OXALATE 5 MG/1
5 TABLET ORAL DAILY
Qty: 30 TABLET | Refills: 1 | Status: SHIPPED | OUTPATIENT
Start: 2019-06-10 | End: 2019-07-22 | Stop reason: SDUPTHER

## 2019-07-22 ENCOUNTER — OFFICE VISIT (OUTPATIENT)
Dept: PSYCHIATRY | Facility: CLINIC | Age: 19
End: 2019-07-22

## 2019-07-22 VITALS
DIASTOLIC BLOOD PRESSURE: 86 MMHG | HEIGHT: 70 IN | BODY MASS INDEX: 29.32 KG/M2 | HEART RATE: 88 BPM | SYSTOLIC BLOOD PRESSURE: 130 MMHG | WEIGHT: 204.8 LBS

## 2019-07-22 DIAGNOSIS — F51.05 INSOMNIA DUE TO MENTAL CONDITION: ICD-10-CM

## 2019-07-22 DIAGNOSIS — F90.2 ATTENTION DEFICIT HYPERACTIVITY DISORDER, COMBINED TYPE: Primary | ICD-10-CM

## 2019-07-22 DIAGNOSIS — F41.1 GENERALIZED ANXIETY DISORDER: ICD-10-CM

## 2019-07-22 PROCEDURE — 99213 OFFICE O/P EST LOW 20 MIN: CPT | Performed by: NURSE PRACTITIONER

## 2019-07-22 RX ORDER — DEXTROAMPHETAMINE SACCHARATE, AMPHETAMINE ASPARTATE MONOHYDRATE, DEXTROAMPHETAMINE SULFATE AND AMPHETAMINE SULFATE 6.25; 6.25; 6.25; 6.25 MG/1; MG/1; MG/1; MG/1
25 CAPSULE, EXTENDED RELEASE ORAL EVERY MORNING
Qty: 30 CAPSULE | Refills: 0 | Status: SHIPPED | OUTPATIENT
Start: 2019-07-22 | End: 2019-08-23 | Stop reason: SDUPTHER

## 2019-07-22 RX ORDER — ESCITALOPRAM OXALATE 5 MG/1
5 TABLET ORAL DAILY
Qty: 30 TABLET | Refills: 2 | Status: SHIPPED | OUTPATIENT
Start: 2019-07-22 | End: 2019-10-22 | Stop reason: SDUPTHER

## 2019-07-22 NOTE — PROGRESS NOTES
Subjective   Andrew Martinez is a 19 y.o. male is here today for medication management follow-up.Pt presents for follow up at the [place] [facility]  Chief Complaint: Recheck on ADHD  History of Present Illness: pt is doing well.  States he is having problems with insurance covering the Adderall XR right now with his dad's divorce the insurance will be changing.  He was unable to get the XR prescription filled and has been sporadically taking the Adderall 10mg he was previously on when he has to work as he is not having to study right now.  States it does not last him.  Only lasts a couple of hours.  States the Lexapro has helped with the anxiety.  Rates it a 2-3/10 with 10 being worse.  Denies any depression.  Denies any suicidal thoughts.  Body mass index is 29.39 kg/m². weight gain of 9 lbs since last visit.  He is anxious about the upcoming birth of his child in September.  Mood is stable.  No anger outbursts.  Sleeping well at least 8 hours a night.  No medical stressors. 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                The following portions of the patient's history were reviewed and updated as appropriate: allergies, current medications, past family history, past medical history, past social history, past surgical history and problem list.    Review of Systems   Constitutional: Negative for activity change, appetite change and fatigue.   Eyes: Negative for visual disturbance.   Respiratory: Negative.    Cardiovascular: Negative.    Gastrointestinal: Negative for nausea.   Endocrine: Negative.    Genitourinary: Negative.    Musculoskeletal: Negative for arthralgias.   Skin: Negative.    Allergic/Immunologic: Negative.    Neurological: Negative for dizziness, seizures and headaches.   Hematological: Negative.    Psychiatric/Behavioral: Negative for agitation, behavioral problems, confusion, decreased concentration, dysphoric mood, hallucinations, self-injury, sleep  "disturbance and suicidal ideas. The patient is nervous/anxious. The patient is not hyperactive.        Objective   Physical Exam   Constitutional: He is oriented to person, place, and time. He appears well-developed and well-nourished.   Neck: Normal range of motion.   Neurological: He is alert and oriented to person, place, and time.   Psychiatric: He has a normal mood and affect. His speech is normal and behavior is normal. Judgment and thought content normal. Cognition and memory are normal.   Pleasant and engaging   Vitals reviewed.    Blood pressure 130/86, pulse 88, height 177.8 cm (70\"), weight 92.9 kg (204 lb 12.8 oz).    Medication List:   Current Outpatient Medications   Medication Sig Dispense Refill   • amphetamine-dextroamphetamine XR (ADDERALL XR) 25 MG 24 hr capsule Take 1 capsule by mouth Every Morning 30 capsule 0   • escitalopram (LEXAPRO) 5 MG tablet Take 1 tablet by mouth Daily. 30 tablet 2     No current facility-administered medications for this visit.        Mental Status Exam:   Hygiene:   good  Cooperation:  Cooperative  Eye Contact:  Good  Psychomotor Behavior:  Appropriate  Affect:  Full range  Hopelessness: Denies  Speech:  Normal  Thought Process:  Goal directed  Thought Content:  Normal  Suicidal:  None  Homicidal:  None  Hallucinations:  None  Delusion:  None  Memory:  Intact  Orientation:  Person, Place, Time and Situation  Reliability:  good  Insight:  Good  Judgement:  Good  Impulse Control:  Good  Physical/Medical Issues:  No     Assessment/Plan   Problems Addressed this Visit     None      Visit Diagnoses     Attention deficit hyperactivity disorder, combined type    -  Primary    Relevant Medications    escitalopram (LEXAPRO) 5 MG tablet    amphetamine-dextroamphetamine XR (ADDERALL XR) 25 MG 24 hr capsule    Generalized anxiety disorder        Relevant Medications    escitalopram (LEXAPRO) 5 MG tablet    amphetamine-dextroamphetamine XR (ADDERALL XR) 25 MG 24 hr capsule    " Insomnia due to mental condition        Relevant Medications    escitalopram (LEXAPRO) 5 MG tablet    amphetamine-dextroamphetamine XR (ADDERALL XR) 25 MG 24 hr capsule        Kulwinder talbot.  UDS UTD and appropriate.   Functionality: pt having minimal impairment in important areas of daily functioning.  Prognosis:  good dependent on medication/follow up and treatment plan compliance.  We discussed meds.  He is to continue the lecapro at current dosing.  I am going to try and preauth the adderall.  We did discuss that he may have to pay for it as some insurances will not cover the X R formulations.  He will see how much it costs.  If problems continue he is agreeable to take something BID if necessary.  He denies needing any therapy at this time.  Continuing efforts to promote the therapeutic alliance, address the patient's issues, and strengthen self awareness, insights, and coping skills   Patient is agreeable to call the [facility].  Patient is aware to call 911 or go to the nearest ER should begin having SI/HI. RTC 12 weeks.

## 2019-07-26 ENCOUNTER — TELEPHONE (OUTPATIENT)
Dept: PSYCHIATRY | Facility: CLINIC | Age: 19
End: 2019-07-26

## 2019-08-23 DIAGNOSIS — F90.2 ATTENTION DEFICIT HYPERACTIVITY DISORDER, COMBINED TYPE: ICD-10-CM

## 2019-08-23 RX ORDER — DEXTROAMPHETAMINE SACCHARATE, AMPHETAMINE ASPARTATE MONOHYDRATE, DEXTROAMPHETAMINE SULFATE AND AMPHETAMINE SULFATE 6.25; 6.25; 6.25; 6.25 MG/1; MG/1; MG/1; MG/1
25 CAPSULE, EXTENDED RELEASE ORAL EVERY MORNING
Qty: 30 CAPSULE | Refills: 0 | Status: SHIPPED | OUTPATIENT
Start: 2019-08-23 | End: 2019-09-23 | Stop reason: SDUPTHER

## 2019-09-23 DIAGNOSIS — F90.2 ATTENTION DEFICIT HYPERACTIVITY DISORDER, COMBINED TYPE: ICD-10-CM

## 2019-09-23 RX ORDER — DEXTROAMPHETAMINE SACCHARATE, AMPHETAMINE ASPARTATE MONOHYDRATE, DEXTROAMPHETAMINE SULFATE AND AMPHETAMINE SULFATE 6.25; 6.25; 6.25; 6.25 MG/1; MG/1; MG/1; MG/1
25 CAPSULE, EXTENDED RELEASE ORAL EVERY MORNING
Qty: 30 CAPSULE | Refills: 0 | Status: SHIPPED | OUTPATIENT
Start: 2019-09-23 | End: 2019-10-22 | Stop reason: SDUPTHER

## 2019-10-22 ENCOUNTER — OFFICE VISIT (OUTPATIENT)
Dept: PSYCHIATRY | Facility: CLINIC | Age: 19
End: 2019-10-22

## 2019-10-22 VITALS
WEIGHT: 200 LBS | SYSTOLIC BLOOD PRESSURE: 124 MMHG | BODY MASS INDEX: 28.63 KG/M2 | HEIGHT: 70 IN | HEART RATE: 104 BPM | DIASTOLIC BLOOD PRESSURE: 80 MMHG

## 2019-10-22 DIAGNOSIS — F41.1 GENERALIZED ANXIETY DISORDER: ICD-10-CM

## 2019-10-22 DIAGNOSIS — F90.2 ATTENTION DEFICIT HYPERACTIVITY DISORDER, COMBINED TYPE: Primary | ICD-10-CM

## 2019-10-22 DIAGNOSIS — F51.05 INSOMNIA DUE TO MENTAL CONDITION: ICD-10-CM

## 2019-10-22 PROCEDURE — 99213 OFFICE O/P EST LOW 20 MIN: CPT | Performed by: NURSE PRACTITIONER

## 2019-10-22 RX ORDER — DEXTROAMPHETAMINE SACCHARATE, AMPHETAMINE ASPARTATE MONOHYDRATE, DEXTROAMPHETAMINE SULFATE AND AMPHETAMINE SULFATE 6.25; 6.25; 6.25; 6.25 MG/1; MG/1; MG/1; MG/1
25 CAPSULE, EXTENDED RELEASE ORAL EVERY MORNING
Qty: 30 CAPSULE | Refills: 0 | Status: SHIPPED | OUTPATIENT
Start: 2019-10-22 | End: 2019-11-26 | Stop reason: SDUPTHER

## 2019-10-22 RX ORDER — ESCITALOPRAM OXALATE 5 MG/1
5 TABLET ORAL DAILY
Qty: 30 TABLET | Refills: 2 | Status: SHIPPED | OUTPATIENT
Start: 2019-10-22 | End: 2020-01-22 | Stop reason: SDUPTHER

## 2019-10-22 NOTE — TREATMENT PLAN
Multi-Disciplinary Problems (from Behavioral Health Treatment Plan)    Active Problems     Problem: Anxiety  Start Date: 10/22/19    Problem Details:  The patient self-scales this problem as a 3 with 10 being the worst.       Goal Priority Start Date Expected End Date End Date    Patient will develop and implement behavioral and cognitive strategies to reduce anxiety and irrational fears. -- 10/22/19 -- --    Goal Details:  Progress toward goal:  The patient self-scales their progress related to this goal as a 3 with 10 being the worst.       Goal Intervention Frequency Start Date End Date    Help patient explore past emotional issues in relation to present anxiety. Weekly 10/22/19 --    Intervention Details:  Duration of treatment until until remission of symptoms.       Goal Intervention Frequency Start Date End Date    Help patient develop an awareness of their cognitive and physical responses to anxiety. Weekly 10/22/19 --    Intervention Details:  Duration of treatment until until remission of symptoms.                          I have discussed and reviewed this treatment plan with the patient.

## 2019-10-22 NOTE — PROGRESS NOTES
Subjective   Andrew Martinez is a 19 y.o. male is here today for medication management follow-up.Pt presents for follow up at the [place] [facility]  Chief Complaint: Recheck on ADHD  History of Present Illness: pt is doing really well.  He is a new dad and is very excited about this.  His meds continue to work well.  States the adderall helps with focus and concentration. Denies any depression or anxiety.  Not sleeping well but this is due to new infant.  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  Body mass index is 28.7 kg/m². no appetite changes.  Denies any SI, HI, or any AVH.  No negative side effects to the meds.  Mood is stable.  No medical stressors.  Very pleased with medications.               The following portions of the patient's history were reviewed and updated as appropriate: allergies, current medications, past family history, past medical history, past social history, past surgical history and problem list.    Review of Systems   Constitutional: Negative for activity change, appetite change and fatigue.   Eyes: Negative for visual disturbance.   Respiratory: Negative.    Cardiovascular: Negative.    Gastrointestinal: Negative for nausea.   Endocrine: Negative.    Genitourinary: Negative.    Musculoskeletal: Negative for arthralgias.   Skin: Negative.    Allergic/Immunologic: Negative.    Neurological: Negative for dizziness, seizures and headaches.   Hematological: Negative.    Psychiatric/Behavioral: Negative for agitation, behavioral problems, confusion, decreased concentration, dysphoric mood, hallucinations, self-injury, sleep disturbance and suicidal ideas. The patient is nervous/anxious. The patient is not hyperactive.        Objective   Physical Exam   Constitutional: He is oriented to person, place, and time. He appears well-developed and well-nourished.   Neck: Normal range of motion.   Neurological: He is alert and oriented to person, place, and time.   Psychiatric: He has a  "normal mood and affect. His speech is normal and behavior is normal. Judgment and thought content normal. Cognition and memory are normal.   Pleasant and engaging   Vitals reviewed.    Blood pressure 124/80, pulse 104, height 177.8 cm (70\"), weight 90.7 kg (200 lb).    Medication List:   Current Outpatient Medications   Medication Sig Dispense Refill   • amphetamine-dextroamphetamine XR (ADDERALL XR) 25 MG 24 hr capsule Take 1 capsule by mouth Every Morning 30 capsule 0   • escitalopram (LEXAPRO) 5 MG tablet Take 1 tablet by mouth Daily. 30 tablet 2     No current facility-administered medications for this visit.        Mental Status Exam:   Hygiene:   good  Cooperation:  Cooperative  Eye Contact:  Good  Psychomotor Behavior:  Appropriate  Affect:  Full range  Hopelessness: Denies  Speech:  Normal  Thought Process:  Goal directed  Thought Content:  Normal  Suicidal:  None  Homicidal:  None  Hallucinations:  None  Delusion:  None  Memory:  Intact  Orientation:  Person, Place, Time and Situation  Reliability:  good  Insight:  Good  Judgement:  Good  Impulse Control:  Good  Physical/Medical Issues:  No     Assessment/Plan   Problems Addressed this Visit     None      Visit Diagnoses     Attention deficit hyperactivity disorder, combined type    -  Primary    Relevant Medications    escitalopram (LEXAPRO) 5 MG tablet    amphetamine-dextroamphetamine XR (ADDERALL XR) 25 MG 24 hr capsule    Generalized anxiety disorder        Relevant Medications    escitalopram (LEXAPRO) 5 MG tablet    amphetamine-dextroamphetamine XR (ADDERALL XR) 25 MG 24 hr capsule    Insomnia due to mental condition        Relevant Medications    escitalopram (LEXAPRO) 5 MG tablet    amphetamine-dextroamphetamine XR (ADDERALL XR) 25 MG 24 hr capsule        Kuwlinder talbot.  UDS obtained today and pendingFunctionality: pt having minimal impairment in important areas of daily functioning.  Prognosis:  good dependent on medication/follow up and treatment " plan compliance.  We discussed meds.  He is to continue the medications at current dosing. He denies needing any therapy at this time.  Continuing efforts to promote the therapeutic alliance, address the patient's issues, and strengthen self awareness, insights, and coping skills   Patient is agreeable to call the [facility].  Patient is aware to call 911 or go to the nearest ER should begin having SI/HI. RTC 12 weeks.

## 2019-11-26 DIAGNOSIS — F90.2 ATTENTION DEFICIT HYPERACTIVITY DISORDER, COMBINED TYPE: ICD-10-CM

## 2019-11-26 RX ORDER — DEXTROAMPHETAMINE SACCHARATE, AMPHETAMINE ASPARTATE MONOHYDRATE, DEXTROAMPHETAMINE SULFATE AND AMPHETAMINE SULFATE 6.25; 6.25; 6.25; 6.25 MG/1; MG/1; MG/1; MG/1
25 CAPSULE, EXTENDED RELEASE ORAL EVERY MORNING
Qty: 30 CAPSULE | Refills: 0 | Status: CANCELLED | OUTPATIENT
Start: 2019-11-26

## 2019-11-26 RX ORDER — DEXTROAMPHETAMINE SACCHARATE, AMPHETAMINE ASPARTATE MONOHYDRATE, DEXTROAMPHETAMINE SULFATE AND AMPHETAMINE SULFATE 6.25; 6.25; 6.25; 6.25 MG/1; MG/1; MG/1; MG/1
25 CAPSULE, EXTENDED RELEASE ORAL EVERY MORNING
Qty: 30 CAPSULE | Refills: 0 | Status: SHIPPED | OUTPATIENT
Start: 2019-11-26 | End: 2019-12-26 | Stop reason: SDUPTHER

## 2019-12-26 DIAGNOSIS — F90.2 ATTENTION DEFICIT HYPERACTIVITY DISORDER, COMBINED TYPE: ICD-10-CM

## 2019-12-26 RX ORDER — DEXTROAMPHETAMINE SACCHARATE, AMPHETAMINE ASPARTATE MONOHYDRATE, DEXTROAMPHETAMINE SULFATE AND AMPHETAMINE SULFATE 6.25; 6.25; 6.25; 6.25 MG/1; MG/1; MG/1; MG/1
25 CAPSULE, EXTENDED RELEASE ORAL EVERY MORNING
Qty: 30 CAPSULE | Refills: 0 | Status: SHIPPED | OUTPATIENT
Start: 2019-12-26 | End: 2020-01-22 | Stop reason: SDUPTHER

## 2019-12-26 RX ORDER — DEXTROAMPHETAMINE SACCHARATE, AMPHETAMINE ASPARTATE MONOHYDRATE, DEXTROAMPHETAMINE SULFATE AND AMPHETAMINE SULFATE 6.25; 6.25; 6.25; 6.25 MG/1; MG/1; MG/1; MG/1
25 CAPSULE, EXTENDED RELEASE ORAL EVERY MORNING
Qty: 30 CAPSULE | Refills: 0 | Status: SHIPPED | OUTPATIENT
Start: 2019-12-26 | End: 2019-12-26 | Stop reason: SDUPTHER

## 2020-01-22 ENCOUNTER — OFFICE VISIT (OUTPATIENT)
Dept: PSYCHIATRY | Facility: CLINIC | Age: 20
End: 2020-01-22

## 2020-01-22 VITALS
SYSTOLIC BLOOD PRESSURE: 114 MMHG | WEIGHT: 206.2 LBS | BODY MASS INDEX: 29.52 KG/M2 | HEIGHT: 70 IN | HEART RATE: 86 BPM | DIASTOLIC BLOOD PRESSURE: 74 MMHG

## 2020-01-22 DIAGNOSIS — F41.1 GENERALIZED ANXIETY DISORDER: ICD-10-CM

## 2020-01-22 DIAGNOSIS — F90.2 ATTENTION DEFICIT HYPERACTIVITY DISORDER, COMBINED TYPE: Primary | ICD-10-CM

## 2020-01-22 PROCEDURE — 99213 OFFICE O/P EST LOW 20 MIN: CPT | Performed by: NURSE PRACTITIONER

## 2020-01-22 RX ORDER — DEXTROAMPHETAMINE SACCHARATE, AMPHETAMINE ASPARTATE MONOHYDRATE, DEXTROAMPHETAMINE SULFATE AND AMPHETAMINE SULFATE 6.25; 6.25; 6.25; 6.25 MG/1; MG/1; MG/1; MG/1
25 CAPSULE, EXTENDED RELEASE ORAL EVERY MORNING
Qty: 30 CAPSULE | Refills: 0 | Status: SHIPPED | OUTPATIENT
Start: 2020-01-22 | End: 2020-02-25 | Stop reason: SDUPTHER

## 2020-01-22 RX ORDER — ESCITALOPRAM OXALATE 5 MG/1
5 TABLET ORAL DAILY
Qty: 30 TABLET | Refills: 2 | Status: SHIPPED | OUTPATIENT
Start: 2020-01-22 | End: 2020-04-22 | Stop reason: SDUPTHER

## 2020-01-22 NOTE — PROGRESS NOTES
Subjective   Andrew Martinez is a 19 y.o. male is here today for medication management follow-up.Pt presents for follow up at the [facility]  Chief Complaint: Recheck on ADHD  History of Present Illness: pt is doing really well.  He states that his medications continue to work well.  The stimulant continues to help with the focus and concentration.  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  He denies any depression.  Rates anxiety very low is a 1-2 out of 10 with 10 being worse.  Denies any panic attacks.  No suicidal thoughts or any AV hallucinations.  He is having some occasional nausea in the mornings but does not know if this is because he eats sugar the night before etc.  This is not occurring on a daily basis.  This is not causing any emesis.  No medical stressors.  Mood is stable.  He is sleeping all night without difficulty.  His 4-month-old baby is also sleeping well all night and he said this is a plus.  He continues to be pleased with his medications.            The following portions of the patient's history were reviewed and updated as appropriate: allergies, current medications, past family history, past medical history, past social history, past surgical history and problem list.    Review of Systems   Constitutional: Negative for activity change, appetite change and fatigue.   Eyes: Negative for visual disturbance.   Respiratory: Negative.    Cardiovascular: Negative.    Gastrointestinal: Negative for nausea.   Endocrine: Negative.    Genitourinary: Negative.    Musculoskeletal: Negative for arthralgias.   Skin: Negative.    Allergic/Immunologic: Negative.    Neurological: Negative for dizziness, seizures and headaches.   Hematological: Negative.    Psychiatric/Behavioral: Negative for agitation, behavioral problems, confusion, decreased concentration, dysphoric mood, hallucinations, self-injury, sleep disturbance and suicidal ideas.  "The patient is nervous/anxious. The patient is not hyperactive.        Objective   Physical Exam   Constitutional: He is oriented to person, place, and time. He appears well-developed and well-nourished.   Neck: Normal range of motion.   Neurological: He is alert and oriented to person, place, and time.   Psychiatric: He has a normal mood and affect. His speech is normal and behavior is normal. Judgment and thought content normal. Cognition and memory are normal.   Pleasant and engaging   Vitals reviewed.    Blood pressure 114/74, pulse 86, height 177.8 cm (70\"), weight 93.5 kg (206 lb 3.2 oz).    Medication List:   Current Outpatient Medications   Medication Sig Dispense Refill   • amphetamine-dextroamphetamine XR (ADDERALL XR) 25 MG 24 hr capsule Take 1 capsule by mouth Every Morning 30 capsule 0   • escitalopram (LEXAPRO) 5 MG tablet Take 1 tablet by mouth Daily. 30 tablet 2     No current facility-administered medications for this visit.        Mental Status Exam:   Hygiene:   good  Cooperation:  Cooperative  Eye Contact:  Good  Psychomotor Behavior:  Appropriate  Affect:  Full range  Hopelessness: Denies  Speech:  Normal  Thought Process:  Goal directed  Thought Content:  Normal  Suicidal:  None  Homicidal:  None  Hallucinations:  None  Delusion:  None  Memory:  Intact  Orientation:  Person, Place, Time and Situation  Reliability:  good  Insight:  Good  Judgement:  Good  Impulse Control:  Good  Physical/Medical Issues:  No     Assessment/Plan   Problems Addressed this Visit     None      Visit Diagnoses     Attention deficit hyperactivity disorder, combined type    -  Primary    Relevant Medications    escitalopram (LEXAPRO) 5 MG tablet    amphetamine-dextroamphetamine XR (ADDERALL XR) 25 MG 24 hr capsule    Generalized anxiety disorder        Relevant Medications    escitalopram (LEXAPRO) 5 MG tablet    amphetamine-dextroamphetamine XR (ADDERALL XR) 25 MG 24 hr capsule        Kulwinder talbot.  UDS in past have " been appropriate.   Functionality: pt having minimal impairment in important areas of daily functioning.  Prognosis:  good dependent on medication/follow up and treatment plan compliance.  We discussed meds. He is going to pay attention to what he eats late at night to see if this is causing occasional nausea.  He will let me know if this worsens.   He is to continue the medications at current dosing. He denies needing any therapy at this time.  Continuing efforts to promote the therapeutic alliance, address the patient's issues, and strengthen self awareness, insights, and coping skills   Patient is agreeable to call the [facility].  Patient is aware to call 911 or go to the nearest ER should begin having SI/HI. RTC 12 weeks.

## 2020-02-25 DIAGNOSIS — F90.2 ATTENTION DEFICIT HYPERACTIVITY DISORDER, COMBINED TYPE: ICD-10-CM

## 2020-02-25 RX ORDER — DEXTROAMPHETAMINE SACCHARATE, AMPHETAMINE ASPARTATE MONOHYDRATE, DEXTROAMPHETAMINE SULFATE AND AMPHETAMINE SULFATE 6.25; 6.25; 6.25; 6.25 MG/1; MG/1; MG/1; MG/1
25 CAPSULE, EXTENDED RELEASE ORAL EVERY MORNING
Qty: 30 CAPSULE | Refills: 0 | Status: SHIPPED | OUTPATIENT
Start: 2020-02-25 | End: 2020-03-27 | Stop reason: SDUPTHER

## 2020-03-27 DIAGNOSIS — F90.2 ATTENTION DEFICIT HYPERACTIVITY DISORDER, COMBINED TYPE: ICD-10-CM

## 2020-03-27 RX ORDER — DEXTROAMPHETAMINE SACCHARATE, AMPHETAMINE ASPARTATE MONOHYDRATE, DEXTROAMPHETAMINE SULFATE AND AMPHETAMINE SULFATE 6.25; 6.25; 6.25; 6.25 MG/1; MG/1; MG/1; MG/1
25 CAPSULE, EXTENDED RELEASE ORAL EVERY MORNING
Qty: 30 CAPSULE | Refills: 0 | Status: SHIPPED | OUTPATIENT
Start: 2020-03-27 | End: 2020-04-22 | Stop reason: SDUPTHER

## 2020-04-22 ENCOUNTER — TELEMEDICINE (OUTPATIENT)
Dept: PSYCHIATRY | Facility: CLINIC | Age: 20
End: 2020-04-22

## 2020-04-22 DIAGNOSIS — F41.1 GENERALIZED ANXIETY DISORDER: ICD-10-CM

## 2020-04-22 DIAGNOSIS — F90.2 ATTENTION DEFICIT HYPERACTIVITY DISORDER, COMBINED TYPE: ICD-10-CM

## 2020-04-22 PROCEDURE — 99214 OFFICE O/P EST MOD 30 MIN: CPT | Performed by: NURSE PRACTITIONER

## 2020-04-22 RX ORDER — ESCITALOPRAM OXALATE 10 MG/1
10 TABLET ORAL DAILY
Qty: 30 TABLET | Refills: 0 | Status: SHIPPED | OUTPATIENT
Start: 2020-04-22 | End: 2020-05-26

## 2020-04-22 RX ORDER — DEXTROAMPHETAMINE SACCHARATE, AMPHETAMINE ASPARTATE MONOHYDRATE, DEXTROAMPHETAMINE SULFATE AND AMPHETAMINE SULFATE 5; 5; 5; 5 MG/1; MG/1; MG/1; MG/1
20 CAPSULE, EXTENDED RELEASE ORAL EVERY MORNING
Qty: 30 CAPSULE | Refills: 0 | Status: SHIPPED | OUTPATIENT
Start: 2020-04-22 | End: 2020-05-28

## 2020-04-22 NOTE — PROGRESS NOTES
"Subjective   Patient ID: Andrew Martinez is a 19 y.o. male Pt is being seen today via telemed through My Chart.  Provider is located at the office.  Pt gives permission for video visit.   CC:  Recheck on ADHD and anxiety   History of Present Illness   Presents for a video visit due to the [place] 19 pandemic.  He states that the Lexapro \"took the edge off\" of his anxiety.  He still rates it a 5 out of 10 with 10 being worse on a daily basis.  He denies any panic attacks.  Denies any depression.  He is sleeping well at night.  He is experiencing diaphoresis of his hands and feet which she says is somewhat bothersome.  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  He is excited about this.  His moods are stable.  No other negative side effects to the medication.  No medical stressors.  States that the medication continues to help him with focus and concentration.    The following portions of the patient's history were reviewed and updated as appropriate: allergies, current medications, past family history, past medical history, past social history, past surgical history and problem list.    Review of Systems   Constitutional: Negative for activity change, appetite change and fatigue.   HENT: Negative.    Eyes: Negative for visual disturbance.   Respiratory: Negative.    Cardiovascular: Negative.    Gastrointestinal: Negative for nausea.   Endocrine: Negative.    Genitourinary: Negative.    Musculoskeletal: Negative for arthralgias.   Skin: Negative.    Allergic/Immunologic: Negative.    Neurological: Negative for dizziness, seizures and headaches.   Hematological: Negative.    Psychiatric/Behavioral: Negative for agitation, behavioral problems, confusion, decreased concentration, dysphoric mood, hallucinations, self-injury, sleep disturbance and suicidal ideas. The patient is nervous/anxious. The patient is not hyperactive.        Objective   Mental Status " Exam  Appearance:  clean and casually dressed, appropriate  Attitude toward clinician:  cooperative and agreeable   Speech:    Rate:  regular rate and rhythm   Volume:  normal  Motor:  no abnormal movements present  Mood:  appropriate  Affect:  euthymic  Thought Processes:  linear, logical, and goal directed  Thought Content:  normal  Suicidal Thoughts:  absent  Homicidal Thoughts:  absent  Perceptual Disturbance: no perceptual disturbance  Attention and Concentration:  good  Insight and Judgement:  good  Memory:  memory appears to be intact  Physical Exam   Constitutional: He is oriented to person, place, and time. He appears well-developed and well-nourished.   HENT:   Head: Normocephalic and atraumatic.   Neck: Normal range of motion.   Neurological: He is alert and oriented to person, place, and time.   Psychiatric: He has a normal mood and affect. His speech is normal and behavior is normal. Judgment normal. Cognition and memory are normal.   Pleasant and cooperative     Lab Review:   not applicable  Assessment/Plan   Diagnoses and all orders for this visit:    Attention deficit hyperactivity disorder, combined type  -     amphetamine-dextroamphetamine XR (ADDERALL XR) 20 MG 24 hr capsule; Take 1 capsule by mouth Every Morning    Generalized anxiety disorder  -     escitalopram (LEXAPRO) 10 MG tablet; Take 1 tablet by mouth Daily.    Patient and I had a lengthy discussion regarding his treatment plan.  Patient could be experiencing diaphoresis from either 1 of his medications and this was discussed.  We are going to try to decrease his Adderall further to 20 mg and may be even further on the next visit at 15 mg.  I am increasing the Lexapro to 10 mg for the anxiety.  We did discuss that if the diaphoresis continues to be an issue we could add a small dose of a beta-blocker to try to counteract this.  For now we will see what the change in the medication does.  I will follow-up with him in 4 weeks to assess.  He  will notify me sooner should symptoms worsen.  No follow-ups on file.

## 2020-05-24 DIAGNOSIS — F41.1 GENERALIZED ANXIETY DISORDER: ICD-10-CM

## 2020-05-26 RX ORDER — ESCITALOPRAM OXALATE 10 MG/1
TABLET ORAL
Qty: 30 TABLET | Refills: 0 | Status: SHIPPED | OUTPATIENT
Start: 2020-05-26 | End: 2020-05-28 | Stop reason: SDUPTHER

## 2020-05-28 ENCOUNTER — TELEMEDICINE (OUTPATIENT)
Dept: PSYCHIATRY | Facility: CLINIC | Age: 20
End: 2020-05-28

## 2020-05-28 DIAGNOSIS — F90.2 ATTENTION DEFICIT HYPERACTIVITY DISORDER, COMBINED TYPE: ICD-10-CM

## 2020-05-28 DIAGNOSIS — F41.1 GENERALIZED ANXIETY DISORDER: Primary | ICD-10-CM

## 2020-05-28 DIAGNOSIS — F51.05 INSOMNIA DUE TO MENTAL CONDITION: ICD-10-CM

## 2020-05-28 PROCEDURE — 99213 OFFICE O/P EST LOW 20 MIN: CPT | Performed by: NURSE PRACTITIONER

## 2020-05-28 RX ORDER — PROPRANOLOL HYDROCHLORIDE 10 MG/1
TABLET ORAL
Qty: 30 TABLET | Refills: 1 | Status: SHIPPED | OUTPATIENT
Start: 2020-05-28 | End: 2021-01-19

## 2020-05-28 RX ORDER — ESCITALOPRAM OXALATE 10 MG/1
10 TABLET ORAL DAILY
Qty: 30 TABLET | Refills: 1 | Status: SHIPPED | OUTPATIENT
Start: 2020-05-28 | End: 2020-07-16 | Stop reason: SDUPTHER

## 2020-05-28 NOTE — PROGRESS NOTES
"Subjective   Patient ID: Andrew Martinez is a 19 y.o. male Pt is being seen today via telemed through My Chart.  Provider is located at the office.  Pt gives permission for video visit.   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  He states that he \"freaked out\" and could not focus and organize the questions.  He states that he became mad after this and did not take his Adderall as he thought \"what good is it doing\".  Says that he actually feels better currently off of the Adderall.  Says toward the end he cannot tell that it was helping much with his focus and concentration.  His anxiety is stable on the Lexapro he does not know what happened with his test.  His mood is stable.  Denies any anger outbursts.  Is sleeping well without difficulty and denies depression.      The following portions of the patient's history were reviewed and updated as appropriate: allergies, current medications, past family history, past medical history, past social history, past surgical history and problem list.    Review of Systems   Constitutional: Negative for activity change, appetite change and fatigue.   HENT: Negative.    Eyes: Negative for visual disturbance.   Respiratory: Negative.    Cardiovascular: Negative.    Gastrointestinal: Negative for nausea.   Endocrine: Negative.    Genitourinary: Negative.    Musculoskeletal: Negative for arthralgias.   Skin: Negative.    Allergic/Immunologic: Negative.    Neurological: Negative for dizziness, seizures and headaches.   Hematological: Negative.    Psychiatric/Behavioral: Negative for agitation, behavioral problems, confusion, decreased concentration, dysphoric mood, hallucinations, self-injury, sleep disturbance and suicidal ideas. The patient is nervous/anxious. The patient is not hyperactive.        Objective   Mental Status Exam  Appearance:  clean and casually dressed, " appropriate  Attitude toward clinician:  cooperative and agreeable   Speech:    Rate:  regular rate and rhythm   Volume:  normal  Motor:  no abnormal movements present  Mood:  appropriate  Affect:  euthymic  Thought Processes:  linear, logical, and goal directed  Thought Content:  normal  Suicidal Thoughts:  absent  Homicidal Thoughts:  absent  Perceptual Disturbance: no perceptual disturbance  Attention and Concentration:  good  Insight and Judgement:  good  Memory:  memory appears to be intact  Physical Exam   Constitutional: He is oriented to person, place, and time. He appears well-developed and well-nourished.   HENT:   Head: Normocephalic and atraumatic.   Neck: Normal range of motion.   Neurological: He is alert and oriented to person, place, and time.   Psychiatric: He has a normal mood and affect. His speech is normal and behavior is normal. Judgment normal. Cognition and memory are normal.   Pleasant and cooperative     Lab Review:   not applicable  Assessment/Plan   Diagnoses and all orders for this visit:    Generalized anxiety disorder  -     propranolol (INDERAL) 10 MG tablet; 1 PO bid as needed for anxiety  -     escitalopram (LEXAPRO) 10 MG tablet; Take 1 tablet by mouth Daily.    Attention deficit hyperactivity disorder, combined type    Insomnia due to mental condition    Patient and I had a lengthy discussion regarding his treatment plan.  For now he is going to stay off of the Adderall.  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  We discussed relaxation techniques and deep breathing etc.  If he has any difficulties he will notify me sooner otherwise I will have him follow-up in 7 weeks.  No follow-ups on file.

## 2020-06-25 ENCOUNTER — TELEPHONE (OUTPATIENT)
Dept: PSYCHIATRY | Facility: CLINIC | Age: 20
End: 2020-06-25

## 2020-06-25 DIAGNOSIS — F90.2 ATTENTION DEFICIT HYPERACTIVITY DISORDER, COMBINED TYPE: Primary | ICD-10-CM

## 2020-06-25 RX ORDER — METHYLPHENIDATE HYDROCHLORIDE 18 MG/1
18 TABLET ORAL EVERY MORNING
Qty: 30 TABLET | Refills: 0 | Status: SHIPPED | OUTPATIENT
Start: 2020-06-25 | End: 2020-07-16 | Stop reason: SDUPTHER

## 2020-06-25 NOTE — TELEPHONE ENCOUNTER
He says he is okay with trying something new.  He hasn't been on concerta before. He is supposed to start with Cheondoism soon but doesn't know if it is covered on the insurance he has currently but he is willing to try.

## 2020-06-25 NOTE — TELEPHONE ENCOUNTER
Call and find out if he wants to try a different stimulant.  Remind him that his last visit he states that he had come off of the medication and had actually felt better off of it.  I know he has an upcoming appointment in July.  Get his thoughts and see what he is thinking.

## 2020-07-07 DIAGNOSIS — F41.1 GENERALIZED ANXIETY DISORDER: ICD-10-CM

## 2020-07-07 RX ORDER — PROPRANOLOL HYDROCHLORIDE 10 MG/1
TABLET ORAL
Qty: 30 TABLET | Refills: 0 | OUTPATIENT
Start: 2020-07-07

## 2020-07-16 ENCOUNTER — OFFICE VISIT (OUTPATIENT)
Dept: PSYCHIATRY | Facility: CLINIC | Age: 20
End: 2020-07-16

## 2020-07-16 VITALS
SYSTOLIC BLOOD PRESSURE: 136 MMHG | HEIGHT: 70 IN | BODY MASS INDEX: 31.64 KG/M2 | WEIGHT: 221 LBS | DIASTOLIC BLOOD PRESSURE: 78 MMHG | HEART RATE: 78 BPM | TEMPERATURE: 98 F

## 2020-07-16 DIAGNOSIS — F41.1 GENERALIZED ANXIETY DISORDER: Primary | ICD-10-CM

## 2020-07-16 DIAGNOSIS — F90.2 ATTENTION DEFICIT HYPERACTIVITY DISORDER, COMBINED TYPE: ICD-10-CM

## 2020-07-16 PROCEDURE — 99214 OFFICE O/P EST MOD 30 MIN: CPT | Performed by: NURSE PRACTITIONER

## 2020-07-16 RX ORDER — ESCITALOPRAM OXALATE 10 MG/1
10 TABLET ORAL DAILY
Qty: 30 TABLET | Refills: 2 | Status: SHIPPED | OUTPATIENT
Start: 2020-07-16 | End: 2020-10-15 | Stop reason: SDUPTHER

## 2020-07-16 RX ORDER — METHYLPHENIDATE HYDROCHLORIDE 27 MG/1
27 TABLET ORAL EVERY MORNING
Qty: 30 TABLET | Refills: 0 | Status: SHIPPED | OUTPATIENT
Start: 2020-07-16 | End: 2020-08-17 | Stop reason: SDUPTHER

## 2020-07-16 NOTE — PROGRESS NOTES
Subjective   Andrew Martinez is a 20 y.o. 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  He is very disappointed over this.  Denies any depression or anxiety.  Says the inderal really helped with his feelings of anxiety while taking the test and he does not understand why he did not pass.  Says he believes the concerta could be increased slightly.  Still continues to have some difficulty focus and concentrating.  Does like the way he feels on the concerta vs the adderall. Says he has more of a personality on the concerta.  Body mass index is 31.71 kg/m². weight gain 15 lbs since January.  No medical stressors.  No negative side effects to the meds.  Sleeping well.  Mood  Is stable.  When he was not taking the stimulant for awhile he says he got 4 speeding tickets.                The following portions of the patient's history were reviewed and updated as appropriate: allergies, current medications, past family history, past medical history, past social history, past surgical history and problem list.    Review of Systems   Constitutional: Negative for activity change, appetite change and fatigue.   Eyes: Negative for visual disturbance.   Respiratory: Negative.    Cardiovascular: Negative.    Gastrointestinal: Negative for nausea.   Endocrine: Negative.    Genitourinary: Negative.    Musculoskeletal: Negative for arthralgias.   Skin: Negative.    Allergic/Immunologic: Negative.    Neurological: Negative for dizziness, seizures and headaches.   Hematological: Negative.    Psychiatric/Behavioral: Negative for agitation, behavioral problems, confusion, decreased concentration, dysphoric mood, hallucinations, self-injury, sleep disturbance and suicidal ideas. The patient is nervous/anxious. The patient  "is not hyperactive.        Objective   Physical Exam   Constitutional: He is oriented to person, place, and time. He appears well-developed and well-nourished.   Neck: Normal range of motion.   Neurological: He is alert and oriented to person, place, and time.   Psychiatric: He has a normal mood and affect. His speech is normal and behavior is normal. Judgment and thought content normal. Cognition and memory are normal.   Pleasant and engaging   Vitals reviewed.    Blood pressure 136/78, pulse 78, temperature 98 °F (36.7 °C), height 177.8 cm (70\"), weight 100 kg (221 lb).    Medication List:   Current Outpatient Medications   Medication Sig Dispense Refill   • escitalopram (LEXAPRO) 10 MG tablet Take 1 tablet by mouth Daily. 30 tablet 2   • methylphenidate (CONCERTA) 27 MG CR tablet Take 1 tablet by mouth Every Morning 30 tablet 0   • propranolol (INDERAL) 10 MG tablet 1 PO bid as needed for anxiety 30 tablet 1     No current facility-administered medications for this visit.        Mental Status Exam:   Hygiene:   good  Cooperation:  Cooperative  Eye Contact:  Good  Psychomotor Behavior:  Appropriate  Affect:  Full range  Hopelessness: Denies  Speech:  Normal  Thought Process:  Goal directed  Thought Content:  Normal  Suicidal:  None  Homicidal:  None  Hallucinations:  None  Delusion:  None  Memory:  Intact  Orientation:  Person, Place, Time and Situation  Reliability:  good  Insight:  Good  Judgement:  Good  Impulse Control:  Good  Physical/Medical Issues:  No     Assessment/Plan   Problems Addressed this Visit     None      Visit Diagnoses     Generalized anxiety disorder    -  Primary    Relevant Medications    escitalopram (LEXAPRO) 10 MG tablet    methylphenidate (CONCERTA) 27 MG CR tablet    Attention deficit hyperactivity disorder, combined type        Relevant Medications    escitalopram (LEXAPRO) 10 MG tablet    methylphenidate (CONCERTA) 27 MG CR tablet        Kulwinder talbot.  UDS in past have been " appropriate. uds collected today and pending  Functionality: pt having minimal impairment in important areas of daily functioning.  Prognosis:  good dependent on medication/follow up and treatment plan compliance.  I am increasing the Concerta slightly to 27 mg.  He is to continue the Lexapro as prescribed.  He does not require a prescription for the Inderal today.  I will have him follow-up in 3 months.  He will let me know should there be any problems with the Concerta dosage and I can see him sooner if needed.  Continuing efforts to promote the therapeutic alliance, address the patient's issues, and strengthen self awareness, insights, and coping skills   Patient is agreeable to call the [facility].  Patient is aware to call 911 or go to the nearest ER should begin having SI/HI. RTC 12 weeks.

## 2020-07-21 ENCOUNTER — TELEPHONE (OUTPATIENT)
Dept: PSYCHIATRY | Facility: CLINIC | Age: 20
End: 2020-07-21

## 2020-07-21 NOTE — TELEPHONE ENCOUNTER
Received Fax from Pharmacy Patient is needing a Prior Authorization on Methylphenidate [facility] ER 27 mg , Terea is out this week can someone cover ?

## 2020-08-17 DIAGNOSIS — F90.2 ATTENTION DEFICIT HYPERACTIVITY DISORDER, COMBINED TYPE: ICD-10-CM

## 2020-08-17 RX ORDER — METHYLPHENIDATE HYDROCHLORIDE 27 MG/1
27 TABLET ORAL EVERY MORNING
Qty: 30 TABLET | Refills: 0 | Status: SHIPPED | OUTPATIENT
Start: 2020-08-17 | End: 2020-09-21 | Stop reason: SDUPTHER

## 2020-09-21 DIAGNOSIS — F90.2 ATTENTION DEFICIT HYPERACTIVITY DISORDER, COMBINED TYPE: ICD-10-CM

## 2020-09-21 RX ORDER — METHYLPHENIDATE HYDROCHLORIDE 27 MG/1
27 TABLET ORAL EVERY MORNING
Qty: 30 TABLET | Refills: 0 | Status: SHIPPED | OUTPATIENT
Start: 2020-09-21 | End: 2020-10-15 | Stop reason: SDUPTHER

## 2020-10-15 ENCOUNTER — OFFICE VISIT (OUTPATIENT)
Dept: PSYCHIATRY | Facility: CLINIC | Age: 20
End: 2020-10-15

## 2020-10-15 DIAGNOSIS — F41.1 GENERALIZED ANXIETY DISORDER: ICD-10-CM

## 2020-10-15 DIAGNOSIS — F90.2 ATTENTION DEFICIT HYPERACTIVITY DISORDER, COMBINED TYPE: ICD-10-CM

## 2020-10-15 PROCEDURE — 99442 PR PHYS/QHP TELEPHONE EVALUATION 11-20 MIN: CPT | Performed by: NURSE PRACTITIONER

## 2020-10-15 RX ORDER — ESCITALOPRAM OXALATE 10 MG/1
10 TABLET ORAL DAILY
Qty: 30 TABLET | Refills: 2 | Status: SHIPPED | OUTPATIENT
Start: 2020-10-15 | End: 2020-11-19 | Stop reason: SDUPTHER

## 2020-10-15 RX ORDER — METHYLPHENIDATE HYDROCHLORIDE 27 MG/1
27 TABLET ORAL EVERY MORNING
Qty: 30 TABLET | Refills: 0 | Status: SHIPPED | OUTPATIENT
Start: 2020-10-15 | End: 2020-11-19 | Stop reason: SDUPTHER

## 2020-10-15 NOTE — PROGRESS NOTES
This is a telephone visit due to the covid pandemic.  Provider is quarantined at home.  Pt gives permission for the visit to occur via telephone.      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  Liking it.  Denies any depression.  Minimal anxiety.  Sleeping well.  Mood is stable. No negative side effects to the meds.  Stimulant continues to help with focus and concentration.      Treatment plan:  He is to continue the lexapro and the stimulant at current dosing.  Will RTC 3 months sooner if needed. rikki reviewed.  UDS in past apporpriated    This visit has been rescheduled as a phone visit to comply with patient safety concerns in accordance with CDC recommendations. Total time of discussion was 15 minutes.

## 2020-11-19 ENCOUNTER — OFFICE VISIT (OUTPATIENT)
Dept: PSYCHIATRY | Facility: CLINIC | Age: 20
End: 2020-11-19

## 2020-11-19 DIAGNOSIS — F90.2 ATTENTION DEFICIT HYPERACTIVITY DISORDER, COMBINED TYPE: ICD-10-CM

## 2020-11-19 DIAGNOSIS — F41.1 GENERALIZED ANXIETY DISORDER: ICD-10-CM

## 2020-11-19 PROCEDURE — 99441 PR PHYS/QHP TELEPHONE EVALUATION 5-10 MIN: CPT | Performed by: NURSE PRACTITIONER

## 2020-11-19 RX ORDER — ESCITALOPRAM OXALATE 10 MG/1
10 TABLET ORAL DAILY
Qty: 30 TABLET | Refills: 2 | Status: SHIPPED | OUTPATIENT
Start: 2020-11-19 | End: 2021-01-19 | Stop reason: SDUPTHER

## 2020-11-19 RX ORDER — METHYLPHENIDATE HYDROCHLORIDE 27 MG/1
27 TABLET ORAL EVERY MORNING
Qty: 30 TABLET | Refills: 0 | Status: SHIPPED | OUTPATIENT
Start: 2020-11-19 | End: 2021-01-19 | Stop reason: SDUPTHER

## 2020-11-19 RX ORDER — METHYLPHENIDATE HYDROCHLORIDE 10 MG/1
10 TABLET ORAL DAILY
Qty: 30 TABLET | Refills: 0 | Status: SHIPPED | OUTPATIENT
Start: 2020-11-19 | End: 2021-01-19 | Stop reason: SDUPTHER

## 2020-11-19 NOTE — PROGRESS NOTES
"This is a telephone visit due to the covid pandemic.  .  Pt gives permission for the visit to occur via telephone.      CC:  Recheck anxiety and ADHD  Pt states he is having issues at work keeping up with requirements.  Says he has to do the telemetry strips etc and it is overwhelming.  He says he does well at the beginning of the shift but the med \"poops out\" and problems are occurring more towards the end.  Denies any depression.  No other issues.    .      Treatment plan: I am going to go ahead and add a quick acting dose of the methylphenidate for him to take mid shift to help him with his focus and concentration until the end of his shift.  He will continue the Lexapro as prescribed.  He will follow-up with me in 2 months.  Sooner if needed.    This visit has been rescheduled as a phone visit to comply with patient safety concerns in accordance with CDC recommendations. Total time of discussion was 10 minutes.    "

## 2020-12-19 ENCOUNTER — IMMUNIZATION (OUTPATIENT)
Dept: VACCINE CLINIC | Facility: HOSPITAL | Age: 20
End: 2020-12-19

## 2020-12-19 PROCEDURE — 0001A: CPT | Performed by: FAMILY MEDICINE

## 2020-12-19 PROCEDURE — 91300 HC SARSCOV02 VAC 30MCG/0.3ML IM: CPT | Performed by: FAMILY MEDICINE

## 2020-12-21 ENCOUNTER — APPOINTMENT (OUTPATIENT)
Dept: VACCINE CLINIC | Facility: HOSPITAL | Age: 20
End: 2020-12-21

## 2021-01-09 ENCOUNTER — IMMUNIZATION (OUTPATIENT)
Dept: VACCINE CLINIC | Facility: HOSPITAL | Age: 21
End: 2021-01-09

## 2021-01-09 PROCEDURE — 91300 HC SARSCOV02 VAC 30MCG/0.3ML IM: CPT | Performed by: FAMILY MEDICINE

## 2021-01-09 PROCEDURE — 0002A: CPT | Performed by: FAMILY MEDICINE

## 2021-01-09 PROCEDURE — 0001A: CPT | Performed by: FAMILY MEDICINE

## 2021-01-19 ENCOUNTER — OFFICE VISIT (OUTPATIENT)
Dept: PSYCHIATRY | Facility: CLINIC | Age: 21
End: 2021-01-19

## 2021-01-19 VITALS
HEART RATE: 97 BPM | WEIGHT: 227.4 LBS | BODY MASS INDEX: 32.56 KG/M2 | DIASTOLIC BLOOD PRESSURE: 82 MMHG | SYSTOLIC BLOOD PRESSURE: 118 MMHG | HEIGHT: 70 IN | TEMPERATURE: 97.9 F

## 2021-01-19 DIAGNOSIS — F51.05 INSOMNIA DUE TO MENTAL CONDITION: ICD-10-CM

## 2021-01-19 DIAGNOSIS — F41.1 GENERALIZED ANXIETY DISORDER: ICD-10-CM

## 2021-01-19 DIAGNOSIS — F90.2 ATTENTION DEFICIT HYPERACTIVITY DISORDER, COMBINED TYPE: Primary | ICD-10-CM

## 2021-01-19 PROCEDURE — 99214 OFFICE O/P EST MOD 30 MIN: CPT | Performed by: NURSE PRACTITIONER

## 2021-01-19 RX ORDER — METHYLPHENIDATE HYDROCHLORIDE 10 MG/1
10 TABLET ORAL DAILY
Qty: 30 TABLET | Refills: 0 | Status: SHIPPED | OUTPATIENT
Start: 2021-01-19 | End: 2021-03-10 | Stop reason: SDUPTHER

## 2021-01-19 RX ORDER — ESCITALOPRAM OXALATE 10 MG/1
10 TABLET ORAL DAILY
Qty: 30 TABLET | Refills: 2 | Status: SHIPPED | OUTPATIENT
Start: 2021-01-19 | End: 2021-04-19 | Stop reason: SDUPTHER

## 2021-01-19 RX ORDER — METHYLPHENIDATE HYDROCHLORIDE 27 MG/1
27 TABLET ORAL EVERY MORNING
Qty: 30 TABLET | Refills: 0 | Status: SHIPPED | OUTPATIENT
Start: 2021-01-19 | End: 2021-03-10 | Stop reason: SDUPTHER

## 2021-01-19 NOTE — PROGRESS NOTES
Subjective   Andrew Martinez is a 20 y.o. male is here today for medication management follow-up.Pt presents for follow up at the [facility]  Chief Complaint: Recheck on ADHD  History of Present Illness:     Pt states he is doing well.  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  Continues to work full time.  Work is very stressful with the covid pandemic.  Pt states the afternoon dosage of ritalin does help with focus and concentration.  He denies any depression or anxiety.  Body mass index is 32.63 kg/m².  No appetite changes.  Sleeping well.  No negative side effects.  Mood is stable.  Pleased with meds.                  The following portions of the patient's history were reviewed and updated as appropriate: allergies, current medications, past family history, past medical history, past social history, past surgical history and problem list.    Review of Systems   Constitutional: Negative for activity change, appetite change and fatigue.   Eyes: Negative for visual disturbance.   Respiratory: Negative.    Cardiovascular: Negative.    Gastrointestinal: Negative for nausea.   Endocrine: Negative.    Genitourinary: Negative.    Musculoskeletal: Negative for arthralgias.   Skin: Negative.    Allergic/Immunologic: Negative.    Neurological: Negative for dizziness, seizures and headaches.   Hematological: Negative.    Psychiatric/Behavioral: Negative for agitation, behavioral problems, confusion, decreased concentration, dysphoric mood, hallucinations, self-injury, sleep disturbance and suicidal ideas. The patient is nervous/anxious. The patient is not hyperactive.        Objective   Physical Exam   Constitutional: He is oriented to person, place, and time. He appears well-developed.   Neck: Normal range of motion.   Neurological: He is alert and oriented to person, place, and time.   Psychiatric: His speech is normal and behavior is normal. Judgment and thought content normal.   Pleasant and  "engaging   Vitals reviewed.    Blood pressure 118/82, pulse 97, temperature 97.9 °F (36.6 °C), height 177.8 cm (70\"), weight 103 kg (227 lb 6.4 oz).    Medication List:   Current Outpatient Medications   Medication Sig Dispense Refill   • escitalopram (LEXAPRO) 10 MG tablet Take 1 tablet by mouth Daily. 30 tablet 2   • methylphenidate (Ritalin) 10 MG tablet Take 1 tablet by mouth Daily. In the afternoon 30 tablet 0   • methylphenidate 27 MG CR tablet Take 1 tablet by mouth Every Morning 30 tablet 0     No current facility-administered medications for this visit.        Mental Status Exam:   Hygiene:   good  Cooperation:  Cooperative  Eye Contact:  Good  Psychomotor Behavior:  Appropriate  Affect:  Full range  Hopelessness: Denies  Speech:  Normal  Thought Process:  Goal directed  Thought Content:  Normal  Suicidal:  None  Homicidal:  None  Hallucinations:  None  Delusion:  None  Memory:  Intact  Orientation:  Person, Place, Time and Situation  Reliability:  good  Insight:  Good  Judgement:  Good  Impulse Control:  Good  Physical/Medical Issues:  No     Assessment/Plan   Problems Addressed this Visit     None      Visit Diagnoses     Attention deficit hyperactivity disorder, combined type    -  Primary    Relevant Medications    methylphenidate 27 MG CR tablet    methylphenidate (Ritalin) 10 MG tablet    escitalopram (LEXAPRO) 10 MG tablet    Generalized anxiety disorder        Relevant Medications    methylphenidate 27 MG CR tablet    methylphenidate (Ritalin) 10 MG tablet    escitalopram (LEXAPRO) 10 MG tablet    Insomnia due to mental condition        Relevant Medications    methylphenidate 27 MG CR tablet    methylphenidate (Ritalin) 10 MG tablet    escitalopram (LEXAPRO) 10 MG tablet      Diagnoses       Codes Comments    Attention deficit hyperactivity disorder, combined type    -  Primary ICD-10-CM: F90.2  ICD-9-CM: 314.01     Generalized anxiety disorder     ICD-10-CM: F41.1  ICD-9-CM: 300.02     Insomnia due " to mental condition     ICD-10-CM: F51.05  ICD-9-CM: 300.9, 327.02         Kulwinder dahiana.  UDS in past have been appropriate. uds collected today and pending  Functionality: pt having minimal impairment in important areas of daily functioning.  Prognosis:  good dependent on medication/follow up and treatment plan compliance.  Pt is doing well.  He is to continue the concerta and the ritalin for the adhd, lexapro for the anxiety.  Refills have been submitted.    .  Continuing efforts to promote the therapeutic alliance, address the patient's issues, and strengthen self awareness, insights, and coping skills   Patient is agreeable to call the  Clinic with worsening symptoms.  .  Patient is aware to call 911 or go to the nearest ER should begin having SI/HI. RTC 12 weeks.

## 2021-02-16 NOTE — PROGRESS NOTES
Subjective   Andrew Martinez is a 18 y.o. male is here today for medication management follow-up.Pt presents for follow up at the [facility]  Chief Complaint: Recheck on ADHD  History of Present Illness: Pt states he has had more stressors lately.  He has had more anxiety and feeling tense.  He is worrying more.  States he has always had some anxiety but lately it is worse.  His step mother and father are  and his step sister will not speak to him.  This is causing him more worry.  He denies any depression.  Denies suicidal thoughts, homicidal thoughts, or any A/V hallucinations.  He is sleeping well at least 7 hours a night.  Body mass index is 28.01 kg/m². no appetite changes.  No medical stressors.  Not having to use the fast acting stimulant in the afternoon.  Adderall continues to help with focus and concentration.  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  He has moved back home with him.  Says things are well with he and father.  Mood is stable.  Has had a little more irritability.  Would like to try med for it.           The following portions of the patient's history were reviewed and updated as appropriate: allergies, current medications, past family history, past medical history, past social history, past surgical history and problem list.    Review of Systems   Constitutional: Negative for activity change, appetite change and fatigue.   Eyes: Negative for visual disturbance.   Respiratory: Negative.    Cardiovascular: Negative.    Gastrointestinal: Negative for nausea.   Endocrine: Negative.    Genitourinary: Negative.    Musculoskeletal: Negative for arthralgias.   Skin: Negative.    Allergic/Immunologic: Negative.    Neurological: Negative for dizziness, seizures and headaches.   Hematological: Negative.    Psychiatric/Behavioral: Positive for agitation. Negative for behavioral problems, confusion, decreased concentration, dysphoric mood,  endorsed to NINOSKA Marcano in holding for continued care pending inpatient bed assignment "hallucinations, self-injury, sleep disturbance and suicidal ideas. The patient is nervous/anxious. The patient is not hyperactive.        Objective   Physical Exam   Constitutional: He is oriented to person, place, and time. He appears well-developed and well-nourished.   Neck: Normal range of motion.   Neurological: He is alert and oriented to person, place, and time.   Psychiatric: He has a normal mood and affect. His speech is normal and behavior is normal. Judgment and thought content normal. Cognition and memory are normal.   Pleasant and engaging   Vitals reviewed.    Blood pressure 123/75, pulse 80, height 177.8 cm (70\"), weight 88.5 kg (195 lb 3.2 oz).    Medication List:   Current Outpatient Medications   Medication Sig Dispense Refill   • amphetamine-dextroamphetamine XR (ADDERALL XR) 25 MG 24 hr capsule Take 1 capsule by mouth Every Morning 30 capsule 0   • escitalopram (LEXAPRO) 5 MG tablet Take 1 tablet by mouth Daily. 30 tablet 1     No current facility-administered medications for this visit.        Mental Status Exam:   Hygiene:   good  Cooperation:  Cooperative  Eye Contact:  Good  Psychomotor Behavior:  Appropriate  Affect:  Full range  Hopelessness: Denies  Speech:  Normal  Thought Process:  Goal directed  Thought Content:  Normal  Suicidal:  None  Homicidal:  None  Hallucinations:  None  Delusion:  None  Memory:  Intact  Orientation:  Person, Place, Time and Situation  Reliability:  good  Insight:  Good  Judgement:  Good  Impulse Control:  Good  Physical/Medical Issues:  No     Assessment/Plan   Problems Addressed this Visit     None      Visit Diagnoses     Attention deficit hyperactivity disorder, combined type    -  Primary    Relevant Medications    amphetamine-dextroamphetamine XR (ADDERALL XR) 25 MG 24 hr capsule    escitalopram (LEXAPRO) 5 MG tablet    Generalized anxiety disorder        Relevant Medications    amphetamine-dextroamphetamine XR (ADDERALL XR) 25 MG 24 hr capsule    " escitalopram (LEXAPRO) 5 MG tablet    Insomnia due to mental condition        Relevant Medications    amphetamine-dextroamphetamine XR (ADDERALL XR) 25 MG 24 hr capsule    escitalopram (LEXAPRO) 5 MG tablet        Kulwinder talbot.  UDS UTD and appropriate.   Functionality: pt having minimal impairment in important areas of daily functioning.  Prognosis:  good dependent on medication/follow up and treatment plan compliance.  We discussed meds.  I am starting him on low dose lexapro.  Risks of sexual dysfunction, vivid dreams and increased risk of suicidal thoughts in young individuals discussed with him.  He does not desire psychotherapy at this time.    Continuing efforts to promote the therapeutic alliance, address the patient's issues, and strengthen self awareness, insights, and coping skills Reviewed the risks, benefits, and side effects of the medications; patient acknowledged and verbally consented.  Patient is agreeable to call the [facility].  Patient is aware to call 911 or go to the nearest ER should begin having SI/HI. RTC 6  weeks.

## 2021-03-09 ENCOUNTER — TELEPHONE (OUTPATIENT)
Dept: FAMILY MEDICINE CLINIC | Facility: CLINIC | Age: 21
End: 2021-03-09

## 2021-03-10 DIAGNOSIS — F90.2 ATTENTION DEFICIT HYPERACTIVITY DISORDER, COMBINED TYPE: ICD-10-CM

## 2021-03-10 RX ORDER — METHYLPHENIDATE HYDROCHLORIDE 27 MG/1
27 TABLET ORAL EVERY MORNING
Qty: 30 TABLET | Refills: 0 | Status: SHIPPED | OUTPATIENT
Start: 2021-03-10 | End: 2021-04-19 | Stop reason: SDUPTHER

## 2021-03-10 RX ORDER — METHYLPHENIDATE HYDROCHLORIDE 10 MG/1
10 TABLET ORAL DAILY
Qty: 30 TABLET | Refills: 0 | Status: SHIPPED | OUTPATIENT
Start: 2021-03-10 | End: 2021-04-19 | Stop reason: SDUPTHER

## 2021-04-19 ENCOUNTER — OFFICE VISIT (OUTPATIENT)
Dept: PSYCHIATRY | Facility: CLINIC | Age: 21
End: 2021-04-19

## 2021-04-19 DIAGNOSIS — F41.1 GENERALIZED ANXIETY DISORDER: ICD-10-CM

## 2021-04-19 DIAGNOSIS — F90.2 ATTENTION DEFICIT HYPERACTIVITY DISORDER, COMBINED TYPE: ICD-10-CM

## 2021-04-19 PROCEDURE — 99442 PR PHYS/QHP TELEPHONE EVALUATION 11-20 MIN: CPT | Performed by: NURSE PRACTITIONER

## 2021-04-19 RX ORDER — ESCITALOPRAM OXALATE 10 MG/1
10 TABLET ORAL DAILY
Qty: 90 TABLET | Refills: 0 | Status: SHIPPED | OUTPATIENT
Start: 2021-04-19 | End: 2021-07-19 | Stop reason: SDUPTHER

## 2021-04-19 RX ORDER — METHYLPHENIDATE HYDROCHLORIDE 10 MG/1
10 TABLET ORAL DAILY
Qty: 30 TABLET | Refills: 0 | Status: SHIPPED | OUTPATIENT
Start: 2021-04-19 | End: 2021-07-07 | Stop reason: SDUPTHER

## 2021-04-19 RX ORDER — METHYLPHENIDATE HYDROCHLORIDE 27 MG/1
27 TABLET ORAL EVERY MORNING
Qty: 30 TABLET | Refills: 0 | Status: SHIPPED | OUTPATIENT
Start: 2021-04-19 | End: 2021-07-07 | Stop reason: SDUPTHER

## 2021-04-19 NOTE — PROGRESS NOTES
This is a telephone visit due to the covid pandemic.  .  Pt gives permission for the visit to occur via telephone.      CC:  Recheck anxiety and ADHD  ROS:  Positive for fatigue  Patient states that he is unable to come to the visit today as he cannot find a center for his infant daughter.  He states that he is doing well as far as his medication goes.  He has been working lots of overtime at work.  Does state that he is having lots more fatigue and does not know if this is work-related or something else.  Says that he has had a screen which showed a low testosterone level in the 200s.  He denies any suicidal thoughts or any AV hallucinations.  Denies depression or anxiety.  No negative side effects to the medication.  Medication continues to help with focus and concentration.  No other medical stressors.  Sleeping well at night without difficulty.    Treatment plan: I recommended that patient request to see Dr. Orr the urologist with [facility] as testosterone levels can be very inaccurate and fluctuate.  He is to continue the stimulants for the ADHD and the Lexapro for generalized anxiety disorder.  Refills have been submitted.  Kulwinder was reviewed.  He will return to clinic in 3 months or sooner if needed.      This visit has been rescheduled as a phone visit to comply with patient safety concerns in accordance with CDC recommendations. Total time of discussion was 11 minutes.

## 2021-07-07 ENCOUNTER — TELEPHONE (OUTPATIENT)
Dept: FAMILY MEDICINE CLINIC | Facility: CLINIC | Age: 21
End: 2021-07-07

## 2021-07-07 DIAGNOSIS — F90.2 ATTENTION DEFICIT HYPERACTIVITY DISORDER, COMBINED TYPE: ICD-10-CM

## 2021-07-07 RX ORDER — METHYLPHENIDATE HYDROCHLORIDE 27 MG/1
27 TABLET ORAL EVERY MORNING
Qty: 30 TABLET | Refills: 0 | Status: SHIPPED | OUTPATIENT
Start: 2021-07-07 | End: 2021-08-19 | Stop reason: SDUPTHER

## 2021-07-07 RX ORDER — METHYLPHENIDATE HYDROCHLORIDE 10 MG/1
10 TABLET ORAL DAILY
Qty: 30 TABLET | Refills: 0 | Status: SHIPPED | OUTPATIENT
Start: 2021-07-07 | End: 2021-09-29 | Stop reason: SDUPTHER

## 2021-07-19 ENCOUNTER — OFFICE VISIT (OUTPATIENT)
Dept: PSYCHIATRY | Facility: CLINIC | Age: 21
End: 2021-07-19

## 2021-07-19 VITALS
BODY MASS INDEX: 30.84 KG/M2 | HEART RATE: 67 BPM | DIASTOLIC BLOOD PRESSURE: 79 MMHG | WEIGHT: 215.4 LBS | HEIGHT: 70 IN | SYSTOLIC BLOOD PRESSURE: 116 MMHG

## 2021-07-19 DIAGNOSIS — F41.1 GENERALIZED ANXIETY DISORDER: ICD-10-CM

## 2021-07-19 DIAGNOSIS — F90.2 ATTENTION DEFICIT HYPERACTIVITY DISORDER, COMBINED TYPE: Primary | ICD-10-CM

## 2021-07-19 PROCEDURE — 99214 OFFICE O/P EST MOD 30 MIN: CPT | Performed by: NURSE PRACTITIONER

## 2021-07-19 RX ORDER — ESCITALOPRAM OXALATE 10 MG/1
10 TABLET ORAL DAILY
Qty: 90 TABLET | Refills: 0 | Status: SHIPPED | OUTPATIENT
Start: 2021-07-19 | End: 2022-01-12 | Stop reason: SDUPTHER

## 2021-07-19 NOTE — PROGRESS NOTES
Subjective   Andrew Martinez is a 21 y.o. male is here today for medication management follow-up.Pt presents for follow up at the [facility]  Chief Complaint: Recheck on ADHD  History of Present Illness:   Pt states he continues to do well.  Says the medication continues to do well.  No negative side effects to the meds.  Anxiety is situational, mainly when at work. He denies any depression.  Sleeping well.  Feels like the stimulant continues to help with focus and concentration.  Body mass index is 30.91 kg/m².  No appetite changes.  No medical stressors.  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  Mood is stable.        The following portions of the patient's history were reviewed and updated as appropriate: allergies, current medications, past family history, past medical history, past social history, past surgical history and problem list.    Review of Systems   Constitutional: Negative for activity change, appetite change and fatigue.   Eyes: Negative for visual disturbance.   Respiratory: Negative.    Cardiovascular: Negative.    Gastrointestinal: Negative for nausea.   Endocrine: Negative.    Genitourinary: Negative.    Musculoskeletal: Negative for arthralgias.   Skin: Negative.    Allergic/Immunologic: Negative.    Neurological: Negative for dizziness, seizures and headaches.   Hematological: Negative.    Psychiatric/Behavioral: Negative for agitation, behavioral problems, confusion, decreased concentration, dysphoric mood, hallucinations, self-injury, sleep disturbance and suicidal ideas. The patient is nervous/anxious. The patient is not hyperactive.      Reviewed copied data and there are no changes    Objective   Physical Exam   Constitutional: He is oriented to person, place, and time. He appears well-developed.   Neurological: He is alert and oriented to person, place, and time.   Psychiatric: His speech is normal and behavior is normal. Judgment and  "thought content normal.   Pleasant and engaging   Vitals reviewed.    Blood pressure 116/79, pulse 67, height 177.8 cm (70\"), weight 97.7 kg (215 lb 6.4 oz).    Medication List:   Current Outpatient Medications   Medication Sig Dispense Refill   • erythromycin (ROMYCIN) 5 MG/GM ophthalmic ointment Apply 1 cm ribbon in affected eye(s) up to 6 times daily 3.5 g 3   • escitalopram (LEXAPRO) 10 MG tablet Take 1 tablet by mouth Daily. 90 tablet 0   • methylphenidate (Concerta) 27 MG CR tablet Take 1 tablet by mouth Every Morning 30 tablet 0   • methylphenidate (Ritalin) 10 MG tablet Take 1 tablet by mouth Daily in the afternoon 30 tablet 0     No current facility-administered medications for this visit.     Reviewed copied data and there are no changes    Mental Status Exam:   Hygiene:   good  Cooperation:  Cooperative  Eye Contact:  Good  Psychomotor Behavior:  Appropriate  Affect:  Full range  Hopelessness: Denies  Speech:  Normal  Thought Process:  Goal directed  Thought Content:  Normal  Suicidal:  None  Homicidal:  None  Hallucinations:  None  Delusion:  None  Memory:  Intact  Orientation:  Person, Place, Time and Situation  Reliability:  good  Insight:  Good  Judgement:  Good  Impulse Control:  Good  Physical/Medical Issues:  No     Assessment/Plan   Problems Addressed this Visit     None      Visit Diagnoses     Attention deficit hyperactivity disorder, combined type    -  Primary    Relevant Medications    escitalopram (LEXAPRO) 10 MG tablet    Generalized anxiety disorder        Relevant Medications    escitalopram (LEXAPRO) 10 MG tablet      Diagnoses       Codes Comments    Attention deficit hyperactivity disorder, combined type    -  Primary ICD-10-CM: F90.2  ICD-9-CM: 314.01     Generalized anxiety disorder     ICD-10-CM: F41.1  ICD-9-CM: 300.02         Kulwinder talbot.  UDS in past have been appropriate.   Functionality: pt having minimal impairment in important areas of daily functioning.  Prognosis:  good " dependent on medication/follow up and treatment plan compliance.  Pt is doing well.  He is to continue the concerta and the ritalin for the adhd, lexapro for the anxiety.  Refills have been submitted.    .  Continuing efforts to promote the therapeutic alliance, address the patient's issues, and strengthen self awareness, insights, and coping skills   Patient is agreeable to call the  Clinic with worsening symptoms.  .  Patient is aware to call 911 or go to the nearest ER should begin having SI/HI. RTC 12 weeks.

## 2021-08-12 DIAGNOSIS — F90.2 ATTENTION DEFICIT HYPERACTIVITY DISORDER, COMBINED TYPE: ICD-10-CM

## 2021-08-12 RX ORDER — METHYLPHENIDATE HYDROCHLORIDE 10 MG/1
10 TABLET ORAL DAILY
Qty: 30 TABLET | Refills: 0 | Status: CANCELLED | OUTPATIENT
Start: 2021-08-12 | End: 2022-08-12

## 2021-08-12 RX ORDER — METHYLPHENIDATE HYDROCHLORIDE 27 MG/1
27 TABLET ORAL EVERY MORNING
Qty: 30 TABLET | Refills: 0 | Status: CANCELLED | OUTPATIENT
Start: 2021-08-12

## 2021-08-16 DIAGNOSIS — F90.2 ATTENTION DEFICIT HYPERACTIVITY DISORDER, COMBINED TYPE: ICD-10-CM

## 2021-08-16 RX ORDER — METHYLPHENIDATE HYDROCHLORIDE 27 MG/1
27 TABLET ORAL EVERY MORNING
Qty: 30 TABLET | Refills: 0 | OUTPATIENT
Start: 2021-08-16

## 2021-08-16 RX ORDER — METHYLPHENIDATE HYDROCHLORIDE 10 MG/1
10 TABLET ORAL DAILY
Qty: 30 TABLET | Refills: 0 | OUTPATIENT
Start: 2021-08-16 | End: 2022-08-16

## 2021-08-19 DIAGNOSIS — F90.2 ATTENTION DEFICIT HYPERACTIVITY DISORDER, COMBINED TYPE: ICD-10-CM

## 2021-08-20 RX ORDER — METHYLPHENIDATE HYDROCHLORIDE 27 MG/1
27 TABLET ORAL EVERY MORNING
Qty: 30 TABLET | Refills: 0 | Status: SHIPPED | OUTPATIENT
Start: 2021-08-20 | End: 2021-09-29 | Stop reason: SDUPTHER

## 2021-08-27 ENCOUNTER — LAB (OUTPATIENT)
Dept: LAB | Facility: HOSPITAL | Age: 21
End: 2021-08-27

## 2021-08-27 ENCOUNTER — TRANSCRIBE ORDERS (OUTPATIENT)
Dept: ADMINISTRATIVE | Facility: HOSPITAL | Age: 21
End: 2021-08-27

## 2021-08-27 DIAGNOSIS — R05.9 COUGH: Primary | ICD-10-CM

## 2021-08-27 LAB
FLUAV SUBTYP SPEC NAA+PROBE: NOT DETECTED
FLUBV RNA ISLT QL NAA+PROBE: NOT DETECTED
SARS-COV-2 RNA PNL SPEC NAA+PROBE: NOT DETECTED

## 2021-08-27 PROCEDURE — 87636 SARSCOV2 & INF A&B AMP PRB: CPT | Performed by: INTERNAL MEDICINE

## 2021-09-21 DIAGNOSIS — F90.2 ATTENTION DEFICIT HYPERACTIVITY DISORDER, COMBINED TYPE: ICD-10-CM

## 2021-09-21 RX ORDER — METHYLPHENIDATE HYDROCHLORIDE 27 MG/1
27 TABLET ORAL EVERY MORNING
Qty: 30 TABLET | Refills: 0 | Status: CANCELLED | OUTPATIENT
Start: 2021-09-21

## 2021-09-23 DIAGNOSIS — F90.2 ATTENTION DEFICIT HYPERACTIVITY DISORDER, COMBINED TYPE: ICD-10-CM

## 2021-09-23 RX ORDER — METHYLPHENIDATE HYDROCHLORIDE 10 MG/1
10 TABLET ORAL DAILY
Qty: 30 TABLET | Refills: 0 | Status: CANCELLED | OUTPATIENT
Start: 2021-09-23 | End: 2022-09-23

## 2021-09-29 DIAGNOSIS — F90.2 ATTENTION DEFICIT HYPERACTIVITY DISORDER, COMBINED TYPE: ICD-10-CM

## 2021-09-29 RX ORDER — METHYLPHENIDATE HYDROCHLORIDE 10 MG/1
10 TABLET ORAL DAILY
Qty: 30 TABLET | Refills: 0 | Status: SHIPPED | OUTPATIENT
Start: 2021-09-29 | End: 2022-01-10 | Stop reason: SDUPTHER

## 2021-09-29 RX ORDER — METHYLPHENIDATE HYDROCHLORIDE 27 MG/1
27 TABLET ORAL EVERY MORNING
Qty: 30 TABLET | Refills: 0 | Status: SHIPPED | OUTPATIENT
Start: 2021-09-29 | End: 2021-11-08 | Stop reason: SDUPTHER

## 2021-11-08 DIAGNOSIS — F90.2 ATTENTION DEFICIT HYPERACTIVITY DISORDER, COMBINED TYPE: ICD-10-CM

## 2021-11-08 RX ORDER — METHYLPHENIDATE HYDROCHLORIDE 27 MG/1
27 TABLET ORAL EVERY MORNING
Qty: 30 TABLET | Refills: 0 | Status: SHIPPED | OUTPATIENT
Start: 2021-11-08 | End: 2022-01-10 | Stop reason: SDUPTHER

## 2021-11-08 NOTE — TELEPHONE ENCOUNTER
Tell him that I will send him in a prescription but he needs an appointment in order to continue getting the prescriptions.  He no showed his last one and has no appointment set up

## 2021-12-29 DIAGNOSIS — F90.2 ATTENTION DEFICIT HYPERACTIVITY DISORDER, COMBINED TYPE: ICD-10-CM

## 2021-12-29 DIAGNOSIS — F41.1 GENERALIZED ANXIETY DISORDER: ICD-10-CM

## 2021-12-29 RX ORDER — METHYLPHENIDATE HYDROCHLORIDE 27 MG/1
27 TABLET ORAL EVERY MORNING
Qty: 30 TABLET | Refills: 0 | OUTPATIENT
Start: 2021-12-29

## 2021-12-29 RX ORDER — ESCITALOPRAM OXALATE 10 MG/1
10 TABLET ORAL DAILY
Qty: 90 TABLET | Refills: 0 | OUTPATIENT
Start: 2021-12-29

## 2021-12-29 RX ORDER — METHYLPHENIDATE HYDROCHLORIDE 10 MG/1
10 TABLET ORAL DAILY
Qty: 30 TABLET | Refills: 0 | OUTPATIENT
Start: 2021-12-29 | End: 2022-12-29

## 2022-01-04 DIAGNOSIS — F90.2 ATTENTION DEFICIT HYPERACTIVITY DISORDER, COMBINED TYPE: ICD-10-CM

## 2022-01-04 RX ORDER — METHYLPHENIDATE HYDROCHLORIDE 10 MG/1
10 TABLET ORAL DAILY
Qty: 30 TABLET | Refills: 0 | OUTPATIENT
Start: 2022-01-04 | End: 2023-01-04

## 2022-01-04 RX ORDER — METHYLPHENIDATE HYDROCHLORIDE 27 MG/1
27 TABLET ORAL EVERY MORNING
Qty: 30 TABLET | Refills: 0 | OUTPATIENT
Start: 2022-01-04

## 2022-01-10 DIAGNOSIS — F90.2 ATTENTION DEFICIT HYPERACTIVITY DISORDER, COMBINED TYPE: ICD-10-CM

## 2022-01-10 RX ORDER — METHYLPHENIDATE HYDROCHLORIDE 10 MG/1
10 TABLET ORAL DAILY
Qty: 30 TABLET | Refills: 0 | Status: SHIPPED | OUTPATIENT
Start: 2022-01-10 | End: 2022-02-22

## 2022-01-10 RX ORDER — METHYLPHENIDATE HYDROCHLORIDE 27 MG/1
27 TABLET ORAL EVERY MORNING
Qty: 30 TABLET | Refills: 0 | Status: SHIPPED | OUTPATIENT
Start: 2022-01-10 | End: 2022-02-22

## 2022-01-12 ENCOUNTER — OFFICE VISIT (OUTPATIENT)
Dept: PSYCHIATRY | Facility: CLINIC | Age: 22
End: 2022-01-12

## 2022-01-12 VITALS
WEIGHT: 224 LBS | OXYGEN SATURATION: 98 % | HEIGHT: 70 IN | DIASTOLIC BLOOD PRESSURE: 79 MMHG | SYSTOLIC BLOOD PRESSURE: 128 MMHG | BODY MASS INDEX: 32.07 KG/M2 | HEART RATE: 68 BPM | TEMPERATURE: 96.8 F

## 2022-01-12 DIAGNOSIS — F51.05 INSOMNIA DUE TO MENTAL CONDITION: ICD-10-CM

## 2022-01-12 DIAGNOSIS — F41.1 GENERALIZED ANXIETY DISORDER: ICD-10-CM

## 2022-01-12 DIAGNOSIS — F90.2 ATTENTION DEFICIT HYPERACTIVITY DISORDER, COMBINED TYPE: Primary | ICD-10-CM

## 2022-01-12 PROCEDURE — 99214 OFFICE O/P EST MOD 30 MIN: CPT | Performed by: NURSE PRACTITIONER

## 2022-01-12 RX ORDER — HYDROXYZINE PAMOATE 25 MG/1
25 CAPSULE ORAL NIGHTLY
Qty: 30 CAPSULE | Refills: 2 | Status: SHIPPED | OUTPATIENT
Start: 2022-01-12 | End: 2023-01-25 | Stop reason: SDUPTHER

## 2022-01-12 RX ORDER — ESCITALOPRAM OXALATE 10 MG/1
10 TABLET ORAL DAILY
Qty: 90 TABLET | Refills: 0 | Status: SHIPPED | OUTPATIENT
Start: 2022-01-12 | End: 2022-04-12

## 2022-01-12 NOTE — PROGRESS NOTES
Subjective   Andrew Martinez is a 21 y.o. male is here today for medication management follow-up.Pt presents for follow up at the [facility]  Chief Complaint: Recheck on ADHD  History of Present Illness:   Patient has had some major life changes since her last visit.  He states he caught his partner cheating on him.  Now he is sharing custody of his 2-year-old with her.  States at first this was difficult but has realized that right now it is for the better.  He is in another relationship already and happy with this relationship.  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  States the medication continues to help with focus and concentration.  Does have some trouble sleeping at night.  This has been worse since all of the stressors.  Has tried melatonin and it did not help.  He denies any depression.  Anxiety is manageable.  No negative side effects to the meds.  Mood is stable.Body mass index is 32.14 kg/m². no appetite changes.  No medical stressors.          The following portions of the patient's history were reviewed and updated as appropriate: allergies, current medications, past family history, past medical history, past social history, past surgical history and problem list.    Review of Systems   Constitutional: Negative for activity change, appetite change and fatigue.   Eyes: Negative for visual disturbance.   Respiratory: Negative.    Cardiovascular: Negative.    Gastrointestinal: Negative for nausea.   Endocrine: Negative.    Genitourinary: Negative.    Musculoskeletal: Negative for arthralgias.   Skin: Negative.    Allergic/Immunologic: Negative.    Neurological: Negative for dizziness, seizures and headaches.   Hematological: Negative.    Psychiatric/Behavioral: Positive for sleep disturbance. Negative for agitation, behavioral problems, confusion, decreased concentration, dysphoric mood, hallucinations, self-injury and suicidal ideas. The patient is not  "nervous/anxious and is not hyperactive.      Reviewed copied data and there are no changes    Objective   Physical Exam   Constitutional: He is oriented to person, place, and time. He appears well-developed.   Neurological: He is alert and oriented to person, place, and time.   Psychiatric: His speech is normal and behavior is normal. Judgment and thought content normal.   Pleasant and engaging   Vitals reviewed.    Blood pressure 128/79, pulse 68, temperature 96.8 °F (36 °C), height 177.8 cm (70\"), weight 102 kg (224 lb), SpO2 98 %.    Medication List:   Current Outpatient Medications   Medication Sig Dispense Refill   • erythromycin (ROMYCIN) 5 MG/GM ophthalmic ointment Apply 1 cm ribbon in affected eye(s) up to 6 times daily 3.5 g 3   • escitalopram (LEXAPRO) 10 MG tablet Take 1 tablet by mouth Daily. 90 tablet 0   • methylphenidate (Concerta) 27 MG CR tablet Take 1 tablet by mouth Every Morning. 30 tablet 0   • methylphenidate (Ritalin) 10 MG tablet Take 1 tablet by mouth Daily in the afternoon. 30 tablet 0   • hydrOXYzine pamoate (Vistaril) 25 MG capsule Take 1 capsule by mouth Every Night. 30 capsule 2     No current facility-administered medications for this visit.     Reviewed copied data and there are no changes    Mental Status Exam:   Hygiene:   good  Cooperation:  Cooperative  Eye Contact:  Good  Psychomotor Behavior:  Appropriate  Affect:  Full range  Hopelessness: Denies  Speech:  Normal  Thought Process:  Goal directed  Thought Content:  Normal  Suicidal:  None  Homicidal:  None  Hallucinations:  None  Delusion:  None  Memory:  Intact  Orientation:  Person, Place, Time and Situation  Reliability:  good  Insight:  Good  Judgement:  Good  Impulse Control:  Good  Physical/Medical Issues:  No     Assessment/Plan   Problems Addressed this Visit     None      Visit Diagnoses     Attention deficit hyperactivity disorder, combined type    -  Primary    Relevant Medications    escitalopram (LEXAPRO) 10 MG " tablet    hydrOXYzine pamoate (Vistaril) 25 MG capsule    Generalized anxiety disorder        Relevant Medications    escitalopram (LEXAPRO) 10 MG tablet    hydrOXYzine pamoate (Vistaril) 25 MG capsule    Insomnia due to mental condition        Relevant Medications    escitalopram (LEXAPRO) 10 MG tablet    hydrOXYzine pamoate (Vistaril) 25 MG capsule      Diagnoses       Codes Comments    Attention deficit hyperactivity disorder, combined type    -  Primary ICD-10-CM: F90.2  ICD-9-CM: 314.01     Generalized anxiety disorder     ICD-10-CM: F41.1  ICD-9-CM: 300.02     Insomnia due to mental condition     ICD-10-CM: F51.05  ICD-9-CM: 300.9, 327.02         Kulwinder talbot.  UDS in past have been appropriate. uds performed today and pending.    Functionality: pt having minimal impairment in important areas of daily functioning.  Prognosis:  good dependent on medication/follow up and treatment plan compliance.  PHQ-2 Depression Screening  Little interest or pleasure in doing things? 0   Feeling down, depressed, or hopeless? 0   PHQ-2 Total Score 0       Pt seems to be coping with the sudden liflifechange he experienced recently.  He is to continue the concerta and the ritalin for the adhd, lexapro for the anxiety.  I am adding some hydroxyzine for sleep.  Refills have been submitted.    .  Continuing efforts to promote the therapeutic alliance, address the patient's issues, and strengthen self awareness, insights, and coping skills   Patient is agreeable to call the  Clinic with worsening symptoms.  .  Patient is aware to call 911 or go to the nearest ER should begin having SI/HI. RTC 12 weeks. Sooner if needed.

## 2022-02-22 ENCOUNTER — TELEMEDICINE (OUTPATIENT)
Dept: PSYCHIATRY | Facility: CLINIC | Age: 22
End: 2022-02-22

## 2022-02-22 DIAGNOSIS — F41.1 GENERALIZED ANXIETY DISORDER: ICD-10-CM

## 2022-02-22 DIAGNOSIS — F90.2 ATTENTION DEFICIT HYPERACTIVITY DISORDER, COMBINED TYPE: Primary | ICD-10-CM

## 2022-02-22 PROCEDURE — 99213 OFFICE O/P EST LOW 20 MIN: CPT | Performed by: NURSE PRACTITIONER

## 2022-02-22 NOTE — PROGRESS NOTES
"Unable to complete visit using a video connection to the patient. A phone visit was used to complete this visits. Total time of discussion was 10 minutes.    We both tried to connect and could not establish a connection.      Patient states that he is having some more irritability.  He feels like the Concerta is not working.  He left his wallet at the gym this week and is losing his keys daily.  And states that he is basically hungry \"all the time\".  States just like he is \"starving\".  States that when he took the Adderall it seemed to make him more nervous but does not feel like this Concerta is working at all.  Denies any depression or excessive anxiety.  States the Lexapro continues to work well.  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  Sleeping well.  No anger outbursts.    Plan: I am [place] ahead and try him on some Vyvanse.  He is going to download the savings card to help get the process of the medication down.  We will start him on 30 mg.  He is in agreement with this.  He is aware of the risks, benefits, side effects of the medication.  He will continue the Lexapro for the generalized anxiety disorder.  He has a follow-up appointment in 6 weeks and he will keep that.  He will notify me sooner should any problems develop.  rikki reviewed.  uds in past reviewed and appropriate  "

## 2022-04-12 ENCOUNTER — OFFICE VISIT (OUTPATIENT)
Dept: PSYCHIATRY | Facility: CLINIC | Age: 22
End: 2022-04-12

## 2022-04-12 VITALS
OXYGEN SATURATION: 99 % | HEART RATE: 63 BPM | WEIGHT: 222.2 LBS | BODY MASS INDEX: 31.81 KG/M2 | SYSTOLIC BLOOD PRESSURE: 115 MMHG | HEIGHT: 70 IN | DIASTOLIC BLOOD PRESSURE: 71 MMHG | TEMPERATURE: 97 F

## 2022-04-12 DIAGNOSIS — F41.1 GENERALIZED ANXIETY DISORDER: ICD-10-CM

## 2022-04-12 DIAGNOSIS — F90.2 ATTENTION DEFICIT HYPERACTIVITY DISORDER, COMBINED TYPE: Primary | ICD-10-CM

## 2022-04-12 PROCEDURE — 99214 OFFICE O/P EST MOD 30 MIN: CPT | Performed by: NURSE PRACTITIONER

## 2022-04-12 NOTE — PROGRESS NOTES
Subjective   Andrew Martinez is a 21 y.o. male is here today for medication management follow-up.Pt presents for follow up at the [facility]  Chief Complaint: Recheck on ADHD  History of Present Illness:   Pt states he is doing well.  Says the vyvanse is working much better.  He is not experiencing a crash with the med.  He does believe the dosage could be increased slightly.  He denies any depression or anxiety.  Has stopped the lexapro per self and says he is doing fine without it.  No medical stressors.  Sleeping well. Mood is stable.  Body mass index is 31.88 kg/m². no appetite changes.  No negative side effects to the meds. Usually only takes the med on days he is working.  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  .          The following portions of the patient's history were reviewed and updated as appropriate: allergies, current medications, past family history, past medical history, past social history, past surgical history and problem list.    Review of Systems   Constitutional: Negative for activity change, appetite change and fatigue.   Eyes: Negative for visual disturbance.   Respiratory: Negative.    Cardiovascular: Negative.    Gastrointestinal: Negative for nausea.   Endocrine: Negative.    Genitourinary: Negative.    Musculoskeletal: Negative for arthralgias.   Skin: Negative.    Allergic/Immunologic: Negative.    Neurological: Negative for dizziness, seizures and headaches.   Hematological: Negative.    Psychiatric/Behavioral: Negative for agitation, behavioral problems, confusion, decreased concentration, dysphoric mood, hallucinations, self-injury, sleep disturbance and suicidal ideas. The patient is not nervous/anxious and is not hyperactive.        Objective   Physical Exam   Constitutional: He is oriented to person, place, and time. He appears well-developed.   Neurological: He is alert and oriented to person, place, and time.  "  Psychiatric: His speech is normal and behavior is normal. Mood, judgment and thought content normal.   Pleasant and engaging   Vitals reviewed.    Blood pressure 115/71, pulse 63, temperature 97 °F (36.1 °C), height 177.8 cm (70\"), weight 101 kg (222 lb 3.2 oz), SpO2 99 %.    Medication List:   Current Outpatient Medications   Medication Sig Dispense Refill   • lisdexamfetamine (Vyvanse) 40 MG capsule Take 1 capsule by mouth Every Morning 30 capsule 0   • erythromycin (ROMYCIN) 5 MG/GM ophthalmic ointment Apply 1 cm ribbon in affected eye(s) up to 6 times daily 3.5 g 3   • hydrOXYzine pamoate (Vistaril) 25 MG capsule Take 1 capsule by mouth Every Night. 30 capsule 2     No current facility-administered medications for this visit.     Reviewed copied data and there are no changes    Mental Status Exam:   Hygiene:   good  Cooperation:  Cooperative  Eye Contact:  Good  Psychomotor Behavior:  Appropriate  Affect:  Full range  Hopelessness: Denies  Speech:  Normal  Thought Process:  Goal directed  Thought Content:  Normal  Suicidal:  None  Homicidal:  None  Hallucinations:  None  Delusion:  None  Memory:  Intact  Orientation:  Person, Place, Time and Situation  Reliability:  good  Insight:  Good  Judgement:  Good  Impulse Control:  Good  Physical/Medical Issues:  No     Assessment/Plan   Problems Addressed this Visit    None     Visit Diagnoses     Attention deficit hyperactivity disorder, combined type    -  Primary    Relevant Medications    lisdexamfetamine (Vyvanse) 40 MG capsule    Generalized anxiety disorder        Relevant Medications    lisdexamfetamine (Vyvanse) 40 MG capsule      Diagnoses       Codes Comments    Attention deficit hyperactivity disorder, combined type    -  Primary ICD-10-CM: F90.2  ICD-9-CM: 314.01     Generalized anxiety disorder     ICD-10-CM: F41.1  ICD-9-CM: 300.02         Kulwinder talbot.  UDS in past have been appropriate. uds performed today and pending.  He has not had med in 2 days. "    Functionality: pt having minimal impairment in important areas of daily functioning.  Prognosis:  good dependent on medication/follow up and treatment plan compliance.      He is to continue the vyvanse for the adhd.  I am increasing the dosage to 40mg.  Not continuing the lexapro for now.  He will notify me should any problems develop and he feels he needs back on the med.     .  Continuing efforts to promote the therapeutic alliance, address the patient's issues, and strengthen self awareness, insights, and coping skills   Patient is agreeable to call the  Clinic with worsening symptoms.  .  Patient is aware to call 911 or go to the nearest ER should begin having SI/HI. RTC 12-16  weeks. Sooner if needed.

## 2022-04-15 ENCOUNTER — TELEPHONE (OUTPATIENT)
Dept: FAMILY MEDICINE CLINIC | Facility: CLINIC | Age: 22
End: 2022-04-15

## 2022-05-20 DIAGNOSIS — F90.2 ATTENTION DEFICIT HYPERACTIVITY DISORDER, COMBINED TYPE: ICD-10-CM

## 2022-06-29 ENCOUNTER — TELEPHONE (OUTPATIENT)
Dept: FAMILY MEDICINE CLINIC | Facility: CLINIC | Age: 22
End: 2022-06-29

## 2022-06-29 DIAGNOSIS — F90.2 ATTENTION DEFICIT HYPERACTIVITY DISORDER, COMBINED TYPE: ICD-10-CM

## 2022-07-13 ENCOUNTER — TELEMEDICINE (OUTPATIENT)
Dept: PSYCHIATRY | Facility: CLINIC | Age: 22
End: 2022-07-13

## 2022-07-13 DIAGNOSIS — F41.1 GENERALIZED ANXIETY DISORDER: ICD-10-CM

## 2022-07-13 DIAGNOSIS — F90.2 ATTENTION DEFICIT HYPERACTIVITY DISORDER, COMBINED TYPE: Primary | ICD-10-CM

## 2022-07-13 PROCEDURE — 99213 OFFICE O/P EST LOW 20 MIN: CPT | Performed by: NURSE PRACTITIONER

## 2022-07-13 NOTE — PROGRESS NOTES
Subjective   Patient ID: Andrew Martinez is a 22 y.o. male Pt is being seen today via telemed through My Chart.  Provider is located at the office [address] [facility].  Pt is located at home.  He gives permission for the visit to occur via telehealth.  Pt currently has covid.    CC:  Recheck adhd  History of Present Illness  Pt states he is doing well with the vyvanse.  The med continues to help with focus and concentration.  No negative side effects to the med.  Denies any depression or excessive anxiety.  He is having current situational anxiety as his child has been fairly sick with covid and this worries him.  He is sleeping well.  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  Mood is stable.    The following portions of the patient's history were reviewed and updated as appropriate: allergies, current medications, past family history, past medical history, past social history, past surgical history and problem list.    Review of Systems   Constitutional: Negative for activity change, appetite change and fatigue.   HENT: Negative.    Eyes: Negative for visual disturbance.   Respiratory: Negative.    Cardiovascular: Negative.    Gastrointestinal: Negative for nausea.   Endocrine: Negative.    Genitourinary: Negative.    Musculoskeletal: Negative for arthralgias.   Skin: Negative.    Allergic/Immunologic: Negative.    Neurological: Negative for dizziness, seizures and headaches.   Hematological: Negative.    Psychiatric/Behavioral: Negative for agitation, behavioral problems, confusion, decreased concentration, dysphoric mood, hallucinations, self-injury, sleep disturbance and suicidal ideas. The patient is not nervous/anxious and is not hyperactive.        Objective   Mental Status Exam  Appearance:  clean and casually dressed, appropriate  Attitude toward clinician:  cooperative  and agreeable   Speech:    Rate:  regular rate and rhythm   Volume:  normal  Motor:  no abnormal movements present  Mood:  pleasant  Affect:  euthymic  Thought Processes:  linear, logical, and goal directed  Thought Content:  normal  Suicidal Thoughts:  absent  Homicidal Thoughts:  absent  Perceptual Disturbance: no perceptual disturbance  Attention and Concentration:  good  Insight and Judgement:  good  Memory:  memory appears to be intact  Physical Exam  Constitutional:       Appearance: Normal appearance.   Musculoskeletal:      Cervical back: Normal range of motion and neck supple.   Neurological:      General: No focal deficit present.      Mental Status: He is alert and oriented to person, place, and time.   Psychiatric:         Attention and Perception: Attention normal.         Mood and Affect: Mood normal.         Speech: Speech normal.         Behavior: Behavior normal. Behavior is cooperative.         Thought Content: Thought content normal.         Cognition and Memory: Cognition normal.       Lab Review:   not applicable  Assessment & Plan   Diagnoses and all orders for this visit:    1. Attention deficit hyperactivity disorder, combined type (Primary)    2. Generalized anxiety disorder    he is to continue the vyvanse for the adhd.  Anxiety is stable.  He does not require refill today of vyvanse he will notify me when he does.  Will RTC 3 months.  Sooner if needed.    No follow-ups on file.

## 2022-08-07 DIAGNOSIS — F90.2 ATTENTION DEFICIT HYPERACTIVITY DISORDER, COMBINED TYPE: ICD-10-CM

## 2022-09-19 DIAGNOSIS — F90.2 ATTENTION DEFICIT HYPERACTIVITY DISORDER, COMBINED TYPE: ICD-10-CM

## 2022-10-19 ENCOUNTER — OFFICE VISIT (OUTPATIENT)
Dept: PSYCHIATRY | Facility: CLINIC | Age: 22
End: 2022-10-19

## 2022-10-19 VITALS
HEART RATE: 90 BPM | SYSTOLIC BLOOD PRESSURE: 131 MMHG | BODY MASS INDEX: 31.73 KG/M2 | DIASTOLIC BLOOD PRESSURE: 77 MMHG | WEIGHT: 221.6 LBS | HEIGHT: 70 IN

## 2022-10-19 DIAGNOSIS — F41.1 GENERALIZED ANXIETY DISORDER: ICD-10-CM

## 2022-10-19 DIAGNOSIS — F90.2 ATTENTION DEFICIT HYPERACTIVITY DISORDER, COMBINED TYPE: Primary | ICD-10-CM

## 2022-10-19 PROCEDURE — 99214 OFFICE O/P EST MOD 30 MIN: CPT | Performed by: NURSE PRACTITIONER

## 2022-10-19 NOTE — PROGRESS NOTES
Subjective   Andrew Martinez is a 22 y.o. male is here today for medication management follow-up.Pt presents for follow up at the [facility]  Chief Complaint: Recheck on ADHD  History of Present Illness:   Pt states he is doing well.  He denies any depression.  Having some situational anxiety with regards to his daughter.  His ex wife is wanting to move to [place] so there is conflict there.  He says he is handling it.  No panic attacks.  Does not feel he needs to go back on medication for it.  The vyvanse continues to help with focus and concentration.  No negative side effects. No medical stressors.  Body mass index is 31.8 kg/m². no appetite changes.  Usually not taking the vyvanse on days he doesn't work.  Sleeping well.  Mood is stable.        The following portions of the patient's history were reviewed and updated as appropriate: allergies, current medications, past family history, past medical history, past social history, past surgical history and problem list.    Review of Systems   Constitutional: Negative for activity change, appetite change and fatigue.   Eyes: Negative for visual disturbance.   Respiratory: Negative.    Cardiovascular: Negative.    Gastrointestinal: Negative for nausea.   Endocrine: Negative.    Genitourinary: Negative.    Musculoskeletal: Negative for arthralgias.   Skin: Negative.    Allergic/Immunologic: Negative.    Neurological: Negative for dizziness, seizures and headaches.   Hematological: Negative.    Psychiatric/Behavioral: Negative for agitation, behavioral problems, confusion, decreased concentration, dysphoric mood, hallucinations, self-injury, sleep disturbance and suicidal ideas. The patient is not nervous/anxious and is not hyperactive.      Reviewed copied data and there are no changes    Objective   Physical Exam   Constitutional: He is oriented to person, place, and time. He appears well-developed.   Neurological: He is alert and oriented  "to person, place, and time.   Psychiatric: His speech is normal and behavior is normal. Mood, judgment and thought content normal.   Pleasant and engaging   Vitals reviewed.    Blood pressure 131/77, pulse 90, height 177.8 cm (70\"), weight 101 kg (221 lb 9.6 oz).    Medication List:   Current Outpatient Medications   Medication Sig Dispense Refill   • lisdexamfetamine (Vyvanse) 40 MG capsule Take 1 capsule by mouth Every Morning 30 capsule 0   • erythromycin (ROMYCIN) 5 MG/GM ophthalmic ointment Apply 1 cm ribbon in affected eye(s) up to 6 times daily 3.5 g 3   • hydrOXYzine pamoate (Vistaril) 25 MG capsule Take 1 capsule by mouth Every Night. 30 capsule 2     No current facility-administered medications for this visit.     Reviewed copied data and there are no changes    Mental Status Exam:   Hygiene:   good  Cooperation:  Cooperative  Eye Contact:  Good  Psychomotor Behavior:  Appropriate  Affect:  Full range  Hopelessness: Denies  Speech:  Normal  Thought Process:  Goal directed  Thought Content:  Normal  Suicidal:  None  Homicidal:  None  Hallucinations:  None  Delusion:  None  Memory:  Intact  Orientation:  Person, Place, Time and Situation  Reliability:  good  Insight:  Good  Judgement:  Good  Impulse Control:  Good  Physical/Medical Issues:  No     Assessment & Plan   Problems Addressed this Visit    None  Visit Diagnoses     Attention deficit hyperactivity disorder, combined type    -  Primary    Relevant Medications    lisdexamfetamine (Vyvanse) 40 MG capsule    Generalized anxiety disorder        Relevant Medications    lisdexamfetamine (Vyvanse) 40 MG capsule      Diagnoses       Codes Comments    Attention deficit hyperactivity disorder, combined type    -  Primary ICD-10-CM: F90.2  ICD-9-CM: 314.01     Generalized anxiety disorder     ICD-10-CM: F41.1  ICD-9-CM: 300.02         Kulwinder talbot.  UDS in past have been appropriate.    Functionality: pt having minimal impairment in important areas of daily " functioning.  Prognosis:  good dependent on medication/follow up and treatment plan compliance.      He is to continue the vyvanse for the adhd. refjill submitted.      .  Continuing efforts to promote the therapeutic alliance, address the patient's issues, and strengthen self awareness, insights, and coping skills   Patient is agreeable to call the  Clinic with worsening symptoms.  .  Patient is aware to call 911 or go to the nearest ER should begin having SI/HI. RTC 12-16  weeks. Sooner if needed.

## 2022-12-20 DIAGNOSIS — F90.2 ATTENTION DEFICIT HYPERACTIVITY DISORDER, COMBINED TYPE: ICD-10-CM

## 2022-12-20 RX ORDER — LISDEXAMFETAMINE DIMESYLATE 40 MG/1
40 CAPSULE ORAL EVERY MORNING
Qty: 30 CAPSULE | Refills: 0 | OUTPATIENT
Start: 2022-12-20

## 2023-01-25 ENCOUNTER — OFFICE VISIT (OUTPATIENT)
Dept: PSYCHIATRY | Facility: CLINIC | Age: 23
End: 2023-01-25
Payer: COMMERCIAL

## 2023-01-25 VITALS
BODY MASS INDEX: 31.84 KG/M2 | HEIGHT: 70 IN | SYSTOLIC BLOOD PRESSURE: 121 MMHG | TEMPERATURE: 98.6 F | DIASTOLIC BLOOD PRESSURE: 77 MMHG | HEART RATE: 101 BPM | WEIGHT: 222.4 LBS | OXYGEN SATURATION: 98 %

## 2023-01-25 DIAGNOSIS — F90.2 ATTENTION DEFICIT HYPERACTIVITY DISORDER, COMBINED TYPE: Primary | ICD-10-CM

## 2023-01-25 DIAGNOSIS — F41.1 GENERALIZED ANXIETY DISORDER: ICD-10-CM

## 2023-01-25 PROCEDURE — 99214 OFFICE O/P EST MOD 30 MIN: CPT | Performed by: NURSE PRACTITIONER

## 2023-01-25 RX ORDER — PROPRANOLOL HYDROCHLORIDE 20 MG/1
20 TABLET ORAL 2 TIMES DAILY PRN
Qty: 60 TABLET | Refills: 0 | Status: SHIPPED | OUTPATIENT
Start: 2023-01-25

## 2023-01-25 RX ORDER — HYDROXYZINE PAMOATE 25 MG/1
25 CAPSULE ORAL NIGHTLY
Qty: 30 CAPSULE | Refills: 2 | Status: SHIPPED | OUTPATIENT
Start: 2023-01-25

## 2023-01-25 NOTE — PROGRESS NOTES
Subjective   Andrew Martinez is a 22 y.o. male is here today for medication management follow-up.Pt presents for follow up at the [facility]  Chief Complaint: Recheck on ADHD  History of Present Illness:     Pt states he does have some anxiety.  Worried his ex wife will try to move his child to [place].  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  The vyvanse continues to help with focus and concentration but he is having to take it every day now with school and he feels it does not work as well as when he only took it on work days.  He has went part time due to school demands.  He denies depression.  Sleeping well.  Body mass index is 31.91 kg/m². no weight changes.  No medical stressors.  Occasionally takes atarax for anxiety and it does help some.  Mood is stable.    PHQ-2 Depression Screening  Little interest or pleasure in doing things? 0-->not at all   Feeling down, depressed, or hopeless? 0-->not at all   PHQ-2 Total Score 0       .        The following portions of the patient's history were reviewed and updated as appropriate: allergies, current medications, past family history, past medical history, past social history, past surgical history and problem list.    Review of Systems   Constitutional: Negative for activity change, appetite change and fatigue.   Eyes: Negative for visual disturbance.   Respiratory: Negative.    Cardiovascular: Negative.    Gastrointestinal: Negative for nausea.   Endocrine: Negative.    Genitourinary: Negative.    Musculoskeletal: Negative for arthralgias.   Skin: Negative.    Allergic/Immunologic: Negative.    Neurological: Negative for dizziness, seizures and headaches.   Hematological: Negative.    Psychiatric/Behavioral: Negative for agitation, behavioral problems, confusion, decreased concentration, dysphoric mood, hallucinations, self-injury, sleep disturbance and suicidal ideas. The patient is nervous/anxious. The  "patient is not hyperactive.      Reviewed copied data and there are no changes    Objective   Physical Exam   Constitutional: He is oriented to person, place, and time. He appears well-developed.   Neurological: He is alert and oriented to person, place, and time.   Psychiatric: His speech is normal and behavior is normal. Mood, judgment and thought content normal.   Pleasant and engaging   Vitals reviewed.    Blood pressure 121/77, pulse 101, temperature 98.6 °F (37 °C), height 177.8 cm (70\"), weight 101 kg (222 lb 6.4 oz), SpO2 98 %.    Medication List:   Current Outpatient Medications   Medication Sig Dispense Refill   • hydrOXYzine pamoate (Vistaril) 25 MG capsule Take 1 capsule by mouth Every Night. 30 capsule 2   • lisdexamfetamine (Vyvanse) 40 MG capsule Take 1 capsule by mouth Every Morning. 30 capsule 0   • erythromycin (ROMYCIN) 5 MG/GM ophthalmic ointment Apply 1 cm ribbon in affected eye(s) up to 6 times daily 3.5 g 3   • propranolol (INDERAL) 20 MG tablet Take 1 tablet by mouth 2 (Two) Times a Day As Needed (anxiety). 60 tablet 0     No current facility-administered medications for this visit.     Reviewed copied data and there are no changes    Mental Status Exam:   Hygiene:   good  Cooperation:  Cooperative  Eye Contact:  Good  Psychomotor Behavior:  Appropriate  Affect:  Full range  Hopelessness: Denies  Speech:  Normal  Thought Process:  Goal directed  Thought Content:  Normal  Suicidal:  None  Homicidal:  None  Hallucinations:  None  Delusion:  None  Memory:  Intact  Orientation:  Person, Place, Time and Situation  Reliability:  good  Insight:  Good  Judgement:  Good  Impulse Control:  Good  Physical/Medical Issues:  No     Assessment & Plan   Problems Addressed this Visit    None  Visit Diagnoses     Attention deficit hyperactivity disorder, combined type    -  Primary    Relevant Medications    hydrOXYzine pamoate (Vistaril) 25 MG capsule    lisdexamfetamine (Vyvanse) 40 MG capsule    Generalized " anxiety disorder        Relevant Medications    hydrOXYzine pamoate (Vistaril) 25 MG capsule    propranolol (INDERAL) 20 MG tablet    lisdexamfetamine (Vyvanse) 40 MG capsule      Diagnoses       Codes Comments    Attention deficit hyperactivity disorder, combined type    -  Primary ICD-10-CM: F90.2  ICD-9-CM: 314.01     Generalized anxiety disorder     ICD-10-CM: F41.1  ICD-9-CM: 300.02         Kulwinder talbot.  UDS in past have been appropriate.  Oral swab drug screen performed today as pt has his 3 year old with him and he opted for this version.    Functionality: pt having minimal impairment in important areas of daily functioning.  Prognosis:  good dependent on medication/follow up and treatment plan compliance.      He is to continue the vyvanse for the adhd. refjill submitted.  We discussed how the tolerance effect is probably what is going on as far as the Vyvanse not working as well.  I do not feel going up higher on the Vyvanse will help with this and he agrees with that so he is going to stay at the 40 mg for now.  He will continue the hydroxyzine as needed for anxiety.  I am going to give him some Inderal that he can take as needed for anxiety as well.  He feels submitted.  Continuing efforts to promote the therapeutic alliance, address the patient's issues, and strengthen self awareness, insights, and coping skills   Patient is agreeable to call the  Clinic with worsening symptoms.  .  Patient is aware to call 911 or go to the nearest ER should begin having SI/HI. RTC 12  weeks. Sooner if needed.

## 2023-03-02 DIAGNOSIS — F90.2 ATTENTION DEFICIT HYPERACTIVITY DISORDER, COMBINED TYPE: ICD-10-CM

## 2023-04-11 DIAGNOSIS — F90.2 ATTENTION DEFICIT HYPERACTIVITY DISORDER, COMBINED TYPE: ICD-10-CM

## 2023-04-26 ENCOUNTER — OFFICE VISIT (OUTPATIENT)
Dept: PSYCHIATRY | Facility: CLINIC | Age: 23
End: 2023-04-26
Payer: COMMERCIAL

## 2023-04-26 VITALS
TEMPERATURE: 98.2 F | BODY MASS INDEX: 29.26 KG/M2 | OXYGEN SATURATION: 98 % | DIASTOLIC BLOOD PRESSURE: 82 MMHG | HEART RATE: 93 BPM | SYSTOLIC BLOOD PRESSURE: 120 MMHG | WEIGHT: 204.4 LBS | HEIGHT: 70 IN

## 2023-04-26 DIAGNOSIS — F90.2 ATTENTION DEFICIT HYPERACTIVITY DISORDER, COMBINED TYPE: Primary | ICD-10-CM

## 2023-04-26 DIAGNOSIS — F41.1 GENERALIZED ANXIETY DISORDER: ICD-10-CM

## 2023-04-26 PROCEDURE — 99214 OFFICE O/P EST MOD 30 MIN: CPT | Performed by: NURSE PRACTITIONER

## 2023-04-26 NOTE — PROGRESS NOTES
Subjective   Andrew Martinez is a 22 y.o. male is here today for medication management follow-up.Pt presents for follow up at the [facility]  Chief Complaint: Recheck on ADHD  History of Present Illness:     Patient states that he is doing well.  The Vyvanse continues to help with focus and concentration.  He has had more anxiety recently as he took his finals for his master's program yesterday.  School is very stressful as well as being a single parent.  He has also had a recent break-up with his girlfriend and has now moved back in with his parents.  All of this have been situational stressors but he states he is managing them.  He denies any depression.  No negative side effects to the meds.Body mass index is 29.33 kg/m².  Weight loss of 18 pounds since last office visit.  He states he has been much more active and due to the stress of school he thinks it has decreased his appetite.  Mood has been stable.  He has not had to use the Inderal and he occasionally uses the Vistaril for sleep but is usually sleeping well.  .        The following portions of the patient's history were reviewed and updated as appropriate: allergies, current medications, past family history, past medical history, past social history, past surgical history and problem list.    Review of Systems   Constitutional: Negative for activity change, appetite change and fatigue.   Eyes: Negative for visual disturbance.   Respiratory: Negative.    Cardiovascular: Negative.    Gastrointestinal: Negative for nausea.   Endocrine: Negative.    Genitourinary: Negative.    Musculoskeletal: Negative for arthralgias.   Skin: Negative.    Allergic/Immunologic: Negative.    Neurological: Negative for dizziness, seizures and headaches.   Hematological: Negative.    Psychiatric/Behavioral: Negative for agitation, behavioral problems, confusion, decreased concentration, dysphoric mood, hallucinations, self-injury, sleep disturbance and  "suicidal ideas. The patient is nervous/anxious. The patient is not hyperactive.      Reviewed copied data and there are no changes    Objective   Physical Exam   Constitutional: He is oriented to person, place, and time. He appears well-developed.   Neurological: He is alert and oriented to person, place, and time.   Psychiatric: His speech is normal and behavior is normal. Mood, judgment and thought content normal.   Pleasant and engaging   Vitals reviewed.    Blood pressure 120/82, pulse 93, temperature 98.2 °F (36.8 °C), height 177.8 cm (70\"), weight 92.7 kg (204 lb 6.4 oz), SpO2 98 %.    Medication List:   Current Outpatient Medications   Medication Sig Dispense Refill   • erythromycin (ROMYCIN) 5 MG/GM ophthalmic ointment Apply 1 cm ribbon in affected eye(s) up to 6 times daily 3.5 g 3   • hydrOXYzine pamoate (Vistaril) 25 MG capsule Take 1 capsule by mouth Every Night. 30 capsule 2   • lisdexamfetamine (Vyvanse) 40 MG capsule Take 1 capsule by mouth Every Morning. 30 capsule 0   • propranolol (INDERAL) 20 MG tablet Take 1 tablet by mouth 2 (Two) Times a Day As Needed (anxiety). 60 tablet 0     No current facility-administered medications for this visit.     Reviewed copied data and there are no changes    Mental Status Exam:   Hygiene:   good  Cooperation:  Cooperative  Eye Contact:  Good  Psychomotor Behavior:  Appropriate  Affect:  Full range  Hopelessness: Denies  Speech:  Normal  Thought Process:  Goal directed  Thought Content:  Normal  Suicidal:  None  Homicidal:  None  Hallucinations:  None  Delusion:  None  Memory:  Intact  Orientation:  Person, Place, Time and Situation  Reliability:  good  Insight:  Good  Judgement:  Good  Impulse Control:  Good  Physical/Medical Issues:  No     Assessment & Plan   Problems Addressed this Visit    None  Visit Diagnoses     Attention deficit hyperactivity disorder, combined type    -  Primary    Generalized anxiety disorder          Diagnoses       Codes Comments    " Attention deficit hyperactivity disorder, combined type    -  Primary ICD-10-CM: F90.2  ICD-9-CM: 314.01     Generalized anxiety disorder     ICD-10-CM: F41.1  ICD-9-CM: 300.02         Kulwinder talbot.  UDS in past have been appropriate.      Functionality: pt having minimal impairment in important areas of daily functioning.  Prognosis:  good dependent on medication/follow up and treatment plan compliance.    He is going to continue the Vyvanse for the ADHD.  He will continue the Inderal if needed for anxiety and the Vistaril as needed for sleep.  He had refills recently sent in last week.    Continuing efforts to promote the therapeutic alliance, address the patient's issues, and strengthen self awareness, insights, and coping skills   Patient is agreeable to call the  Clinic with worsening symptoms.  .  Patient is aware to call 911 or go to the nearest ER should begin having SI/HI. RTC 12  weeks. Sooner if needed.

## 2023-05-23 DIAGNOSIS — F90.2 ATTENTION DEFICIT HYPERACTIVITY DISORDER, COMBINED TYPE: ICD-10-CM

## 2023-07-26 ENCOUNTER — OFFICE VISIT (OUTPATIENT)
Dept: PSYCHIATRY | Facility: CLINIC | Age: 23
End: 2023-07-26
Payer: COMMERCIAL

## 2023-07-26 VITALS
HEART RATE: 112 BPM | SYSTOLIC BLOOD PRESSURE: 123 MMHG | HEIGHT: 70 IN | BODY MASS INDEX: 28.8 KG/M2 | DIASTOLIC BLOOD PRESSURE: 83 MMHG | WEIGHT: 201.2 LBS | OXYGEN SATURATION: 99 % | TEMPERATURE: 97.7 F

## 2023-07-26 DIAGNOSIS — F90.2 ATTENTION DEFICIT HYPERACTIVITY DISORDER, COMBINED TYPE: Primary | ICD-10-CM

## 2023-07-26 DIAGNOSIS — F41.1 GENERALIZED ANXIETY DISORDER: ICD-10-CM

## 2023-07-26 PROCEDURE — 99214 OFFICE O/P EST MOD 30 MIN: CPT | Performed by: NURSE PRACTITIONER

## 2023-07-26 NOTE — PROGRESS NOTES
Subjective   Andrew Martinez is a 23 y.o. male is here today for medication management follow-up.Pt presents for follow up at the [facility]  Chief Complaint: Recheck on ADHD  History of Present Illness:   Follow-up.  He had called in wishing to restart Lexapro.  He stated he was having anxiety and increased irritability.  He has been back on the medication for approximately 3 weeks and he states that this has helped.  His anxiety is decreased.  He denies any depression.  The Vyvanse continues to help with focus and concentration.  He continues to work full-time and is attending grad school.  Regularly using the Inderal and rarely using the hydroxyzine but likes to have them on hand.  Mood is stable.  No medical stressors. Body mass index is 28.87 kg/m².  No appetite changes.  He is currently pleased with progress.  .      The following portions of the patient's history were reviewed and updated as appropriate: allergies, current medications, past family history, past medical history, past social history, past surgical history and problem list.    Review of Systems   Constitutional:  Negative for activity change, appetite change and fatigue.   Eyes:  Negative for visual disturbance.   Respiratory: Negative.     Cardiovascular: Negative.    Gastrointestinal:  Negative for nausea.   Endocrine: Negative.    Genitourinary: Negative.    Musculoskeletal:  Negative for arthralgias.   Skin: Negative.    Allergic/Immunologic: Negative.    Neurological:  Negative for dizziness, seizures and headaches.   Hematological: Negative.    Psychiatric/Behavioral:  Negative for agitation, behavioral problems, confusion, decreased concentration, dysphoric mood, hallucinations, self-injury, sleep disturbance and suicidal ideas. The patient is nervous/anxious. The patient is not hyperactive.    Reviewed copied data and there are no changes    Objective   Physical Exam   Constitutional: He is oriented to person,  "place, and time. He appears well-developed.   Neurological: He is alert and oriented to person, place, and time.   Psychiatric: His speech is normal and behavior is normal. Mood, judgment and thought content normal.   Pleasant and engaging   Vitals reviewed.  Blood pressure 123/83, pulse 112, temperature 97.7 °F (36.5 °C), height 177.8 cm (70\"), weight 91.3 kg (201 lb 3.2 oz), SpO2 99 %.    Medication List:   Current Outpatient Medications   Medication Sig Dispense Refill    lisdexamfetamine (Vyvanse) 40 MG capsule Take 1 capsule by mouth Every Morning. 30 capsule 0    escitalopram (Lexapro) 10 MG tablet Take 1 tablet by mouth Daily. 90 tablet 0    hydrOXYzine pamoate (Vistaril) 25 MG capsule Take 1 capsule by mouth Every Night. 30 capsule 2    propranolol (INDERAL) 20 MG tablet Take 1 tablet by mouth 2 (Two) Times a Day As Needed (anxiety). 60 tablet 0     No current facility-administered medications for this visit.     Reviewed copied data and there are no changes    Mental Status Exam:   Hygiene:   good  Cooperation:  Cooperative  Eye Contact:  Good  Psychomotor Behavior:  Appropriate  Affect:  Full range  Hopelessness: Denies  Speech:  Normal  Thought Process:  Goal directed  Thought Content:  Normal  Suicidal:  None  Homicidal:  None  Hallucinations:  None  Delusion:  None  Memory:  Intact  Orientation:  Person, Place, Time and Situation  Reliability:  good  Insight:  Good  Judgement:  Good  Impulse Control:  Good  Physical/Medical Issues:  No     Assessment & Plan   Problems Addressed this Visit    None  Visit Diagnoses       Attention deficit hyperactivity disorder, combined type    -  Primary    Relevant Medications    lisdexamfetamine (Vyvanse) 40 MG capsule    Generalized anxiety disorder        Relevant Medications    lisdexamfetamine (Vyvanse) 40 MG capsule          Diagnoses         Codes Comments    Attention deficit hyperactivity disorder, combined type    -  Primary ICD-10-CM: F90.2  ICD-9-CM: 314.01 "     Generalized anxiety disorder     ICD-10-CM: F41.1  ICD-9-CM: 300.02           Kulwinder relaci.  UDS in past have been appropriate.      Functionality: pt having minimal impairment in important areas of daily functioning.  Prognosis:  good dependent on medication/follow up and treatment plan compliance.    He is going to continue the Vyvanse for the ADHD.  He will continue the Inderal if needed for anxiety and the Vistaril as needed for sleep.  Continue the Lexapro for anxiety.  Refills submitted.      Continuing efforts to promote the therapeutic alliance, address the patient's issues, and strengthen self awareness, insights, and coping skills   Patient is agreeable to call the  Clinic with worsening symptoms.  .  Patient is aware to call 911 or go to the nearest ER should begin having SI/HI. RTC 12  weeks. Sooner if needed.

## 2023-09-13 DIAGNOSIS — F90.2 ATTENTION DEFICIT HYPERACTIVITY DISORDER, COMBINED TYPE: ICD-10-CM

## 2023-09-14 RX ORDER — ESCITALOPRAM OXALATE 10 MG/1
10 TABLET ORAL DAILY
Qty: 90 TABLET | Refills: 0 | Status: SHIPPED | OUTPATIENT
Start: 2023-09-14 | End: 2024-09-13

## 2023-11-04 DIAGNOSIS — F90.2 ATTENTION DEFICIT HYPERACTIVITY DISORDER, COMBINED TYPE: ICD-10-CM

## 2023-11-06 RX ORDER — LISDEXAMFETAMINE DIMESYLATE CAPSULES 40 MG/1
40 CAPSULE ORAL EVERY MORNING
Qty: 30 CAPSULE | Refills: 0 | Status: SHIPPED | OUTPATIENT
Start: 2023-11-06

## 2023-12-06 DIAGNOSIS — F90.2 ATTENTION DEFICIT HYPERACTIVITY DISORDER, COMBINED TYPE: ICD-10-CM

## 2023-12-07 RX ORDER — LISDEXAMFETAMINE DIMESYLATE CAPSULES 40 MG/1
40 CAPSULE ORAL EVERY MORNING
Qty: 30 CAPSULE | Refills: 0 | Status: SHIPPED | OUTPATIENT
Start: 2023-12-07

## 2024-01-03 ENCOUNTER — OFFICE VISIT (OUTPATIENT)
Dept: PSYCHIATRY | Facility: CLINIC | Age: 24
End: 2024-01-03
Payer: COMMERCIAL

## 2024-01-03 VITALS
OXYGEN SATURATION: 97 % | SYSTOLIC BLOOD PRESSURE: 132 MMHG | WEIGHT: 214.4 LBS | HEIGHT: 70 IN | TEMPERATURE: 97.7 F | HEART RATE: 116 BPM | BODY MASS INDEX: 30.69 KG/M2 | DIASTOLIC BLOOD PRESSURE: 86 MMHG

## 2024-01-03 DIAGNOSIS — F41.1 GENERALIZED ANXIETY DISORDER: ICD-10-CM

## 2024-01-03 DIAGNOSIS — F90.2 ATTENTION DEFICIT HYPERACTIVITY DISORDER, COMBINED TYPE: Primary | ICD-10-CM

## 2024-01-03 PROCEDURE — 99214 OFFICE O/P EST MOD 30 MIN: CPT | Performed by: NURSE PRACTITIONER

## 2024-01-03 RX ORDER — ESCITALOPRAM OXALATE 10 MG/1
10 TABLET ORAL DAILY
Qty: 90 TABLET | Refills: 0 | Status: SHIPPED | OUTPATIENT
Start: 2024-01-03 | End: 2025-01-02

## 2024-01-03 NOTE — PROGRESS NOTES
Subjective   Andrew Martinez is a 23 y.o. male is here today for medication management follow-up.Pt presents for follow up at the [facility]  Chief Complaint: Recheck on ADHD  History of Present Illness: uses vistaril very seldom.  Uses the inderal situational and says it works great in those circumstances.  He denies depression.  Anxiety much better since the custody issue is resolved.  Rates anxiety a 2/10 with 10 being worse.  Denies any panic attacks.  Has been unable to obtain the Vyvanse due to shortage issues with the medication.  States today he has requested a refill from the pharmacy.  The Vyvanse continues to help with focus and concentration when he has the medicine.  He continues to work and go to school.  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  Mood is stable.  No negative side effects to the meds.  Sleeping is adequate. Body mass index is 30.76 kg/m².  Weight gain of 13 pounds since last office visit back in July.  No medical stressors.  Mood is stable.  .  PHQ-2 Depression Screening  Little interest or pleasure in doing things? 0-->not at all   Feeling down, depressed, or hopeless? 0-->not at all   PHQ-2 Total Score 0           The following portions of the patient's history were reviewed and updated as appropriate: allergies, current medications, past family history, past medical history, past social history, past surgical history and problem list.    Review of Systems   Constitutional:  Negative for activity change, appetite change and fatigue.   Eyes:  Negative for visual disturbance.   Respiratory: Negative.     Cardiovascular: Negative.    Gastrointestinal:  Negative for nausea.   Endocrine: Negative.    Genitourinary: Negative.    Musculoskeletal:  Negative for arthralgias.   Skin: Negative.    Allergic/Immunologic: Negative.    Neurological:  Negative for dizziness, seizures and headaches.   Hematological:  "Negative.    Psychiatric/Behavioral:  Negative for agitation, behavioral problems, confusion, decreased concentration, dysphoric mood, hallucinations, self-injury, sleep disturbance and suicidal ideas. The patient is nervous/anxious. The patient is not hyperactive.      Reviewed copied data and there are no changes    Objective   Physical Exam   Constitutional: He is oriented to person, place, and time. He appears well-developed.   Neurological: He is alert and oriented to person, place, and time.   Psychiatric: His speech is normal and behavior is normal. Mood, judgment and thought content normal.   Pleasant and engaging   Vitals reviewed.    Blood pressure 132/86, pulse 116, temperature 97.7 °F (36.5 °C), height 177.8 cm (70\"), weight 97.3 kg (214 lb 6.4 oz), SpO2 97%.    Medication List:   Current Outpatient Medications   Medication Sig Dispense Refill    Cholecalciferol 125 MCG (5000 UT) tablet Take 1 tablet by mouth Daily.      escitalopram (Lexapro) 10 MG tablet Take 1 tablet by mouth Daily. 90 tablet 0    Cholecalciferol (vitamin D3) 125 MCG (5000 UT) tablet tablet Take 1 tablet (5,000 Units total) by mouth daily. 90 tablet 1    hydrOXYzine pamoate (Vistaril) 25 MG capsule Take 1 capsule by mouth Every Night. 30 capsule 2    lisdexamfetamine (Vyvanse) 40 MG capsule Take 1 capsule by mouth Every Morning. 30 capsule 0    propranolol (INDERAL) 20 MG tablet Take 1 tablet by mouth 2 (Two) Times a Day As Needed (anxiety). 60 tablet 0     No current facility-administered medications for this visit.     Reviewed copied data and there are no changes    Mental Status Exam:   Hygiene:   good  Cooperation:  Cooperative  Eye Contact:  Good  Psychomotor Behavior:  Appropriate  Affect:  Full range  Hopelessness: Denies  Speech:  Normal  Thought Process:  Goal directed  Thought Content:  Normal  Suicidal:  None  Homicidal:  None  Hallucinations:  None  Delusion:  None  Memory:  Intact  Orientation:  Person, Place, Time and " Situation  Reliability:  good  Insight:  Good  Judgement:  Good  Impulse Control:  Good  Physical/Medical Issues:  No     Assessment & Plan   Problems Addressed this Visit    None  Visit Diagnoses       Attention deficit hyperactivity disorder, combined type    -  Primary    Relevant Medications    escitalopram (Lexapro) 10 MG tablet    Generalized anxiety disorder        Relevant Medications    escitalopram (Lexapro) 10 MG tablet          Diagnoses         Codes Comments    Attention deficit hyperactivity disorder, combined type    -  Primary ICD-10-CM: F90.2  ICD-9-CM: 314.01     Generalized anxiety disorder     ICD-10-CM: F41.1  ICD-9-CM: 300.02           Kulwinder talbot.  UDS in past have been appropriate.  Uds obtained today and pending    Functionality: pt having minimal impairment in important areas of daily functioning.  Prognosis:  good dependent on medication/follow up and treatment plan compliance.    He is currently pleased with progress.  He is going to continue the Vyvanse for the ADHD.  He will continue the Inderal if needed for anxiety and the Vistaril as needed for sleep.  Continue the Lexapro for anxiety.  Refills submitted.      Continuing efforts to promote the therapeutic alliance, address the patient's issues, and strengthen self awareness, insights, and coping skills   Patient is agreeable to call the  Clinic with worsening symptoms.  .  Patient is aware to call 911 or go to the nearest ER should begin having SI/HI. RTC 12  weeks. Sooner if needed.

## 2024-01-12 ENCOUNTER — TELEPHONE (OUTPATIENT)
Dept: FAMILY MEDICINE CLINIC | Facility: CLINIC | Age: 24
End: 2024-01-12
Payer: COMMERCIAL

## 2024-01-12 NOTE — TELEPHONE ENCOUNTER
Pt called and stated that he needs his Vyvanse 40mg sent to a different pharmacy due to the [facility] pharmacy not being able to get his medication in. I attempted to contact the pt to inform him that he should be able to request the transfer to a different pharmacy himself without a new script but the pt did not answer. I left a detailed message.

## 2024-02-16 DIAGNOSIS — F90.2 ATTENTION DEFICIT HYPERACTIVITY DISORDER, COMBINED TYPE: ICD-10-CM

## 2024-02-19 RX ORDER — LISDEXAMFETAMINE DIMESYLATE CAPSULES 40 MG/1
40 CAPSULE ORAL EVERY MORNING
Qty: 30 CAPSULE | Refills: 0 | Status: SHIPPED | OUTPATIENT
Start: 2024-02-19

## 2024-03-25 DIAGNOSIS — F90.2 ATTENTION DEFICIT HYPERACTIVITY DISORDER, COMBINED TYPE: ICD-10-CM

## 2024-03-25 RX ORDER — LISDEXAMFETAMINE DIMESYLATE CAPSULES 40 MG/1
40 CAPSULE ORAL EVERY MORNING
Qty: 30 CAPSULE | Refills: 0 | Status: SHIPPED | OUTPATIENT
Start: 2024-03-25

## 2024-04-03 ENCOUNTER — OFFICE VISIT (OUTPATIENT)
Dept: PSYCHIATRY | Facility: CLINIC | Age: 24
End: 2024-04-03
Payer: COMMERCIAL

## 2024-04-03 VITALS
DIASTOLIC BLOOD PRESSURE: 82 MMHG | BODY MASS INDEX: 30.67 KG/M2 | HEIGHT: 70 IN | TEMPERATURE: 96.8 F | OXYGEN SATURATION: 97 % | SYSTOLIC BLOOD PRESSURE: 119 MMHG | WEIGHT: 214.2 LBS | HEART RATE: 82 BPM

## 2024-04-03 DIAGNOSIS — F90.2 ATTENTION DEFICIT HYPERACTIVITY DISORDER, COMBINED TYPE: Primary | ICD-10-CM

## 2024-04-03 DIAGNOSIS — F41.1 GENERALIZED ANXIETY DISORDER: ICD-10-CM

## 2024-04-03 PROCEDURE — 99214 OFFICE O/P EST MOD 30 MIN: CPT | Performed by: NURSE PRACTITIONER

## 2024-04-03 NOTE — PROGRESS NOTES
Subjective   Andrew Martinez is a 23 y.o. male is here today for medication management follow-up.Pt presents for follow up at the [facility]  Chief Complaint: Recheck on ADHD  History of Present Illness:   Patient states that he is doing well.  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  This is very stressful for him as he also cares for his child during the week sometimes he does not really know if the Vyvanse is helping but he states it is due to all the stress and chaos of school and work as well as parenting.  He denies any depression.  Anxiety is situational but manageable.  Sleep is adequate.  No medical stressors.Body mass index is 30.73 kg/m².  No significant weight changes.  No negative side effects to the meds.  He is not having to use the propranolol or the Vistaril.          The following portions of the patient's history were reviewed and updated as appropriate: allergies, current medications, past family history, past medical history, past social history, past surgical history and problem list.    Review of Systems   Constitutional:  Negative for activity change, appetite change and fatigue.   Eyes:  Negative for visual disturbance.   Respiratory: Negative.     Cardiovascular: Negative.    Gastrointestinal:  Negative for nausea.   Endocrine: Negative.    Genitourinary: Negative.    Musculoskeletal:  Negative for arthralgias.   Skin: Negative.    Allergic/Immunologic: Negative.    Neurological:  Negative for dizziness, seizures and headaches.   Hematological: Negative.    Psychiatric/Behavioral:  Negative for agitation, behavioral problems, confusion, decreased concentration, dysphoric mood, hallucinations, self-injury, sleep disturbance and suicidal ideas. The patient is nervous/anxious. The patient is not hyperactive.      Reviewed copied data and  there are no changes    Objective   Physical Exam   Constitutional: He is oriented to person, place, and time. He appears well-developed.   Neurological: He is alert and oriented to person, place, and time.   Psychiatric: His speech is normal and behavior is normal. Mood, judgment and thought content normal.   Pleasant and engaging   Vitals reviewed.    There were no vitals taken for this visit.    Medication List:   Current Outpatient Medications   Medication Sig Dispense Refill    amoxicillin-clavulanate (AUGMENTIN) 875-125 MG per tablet Take 1 tablet by mouth 2 (Two) Times a Day. 6 tablet 0    Cholecalciferol (vitamin D3) 125 MCG (5000 UT) tablet tablet Take 1 tablet (5,000 Units total) by mouth daily. 90 tablet 1    Cholecalciferol 125 MCG (5000 UT) tablet Take 1 tablet by mouth Daily.      escitalopram (Lexapro) 10 MG tablet Take 1 tablet by mouth Daily. 90 tablet 0    hydrOXYzine pamoate (Vistaril) 25 MG capsule Take 1 capsule by mouth Every Night. 30 capsule 2    lisdexamfetamine (Vyvanse) 40 MG capsule Take 1 capsule by mouth Every Morning. 30 capsule 0    propranolol (INDERAL) 20 MG tablet Take 1 tablet by mouth 2 (Two) Times a Day As Needed (anxiety). 60 tablet 0     No current facility-administered medications for this visit.     Reviewed copied data and there are no changes    Mental Status Exam:   Hygiene:   good  Cooperation:  Cooperative  Eye Contact:  Good  Psychomotor Behavior:  Appropriate  Affect:  Full range  Hopelessness: Denies  Speech:  Normal  Thought Process:  Goal directed  Thought Content:  Normal  Suicidal:  None  Homicidal:  None  Hallucinations:  None  Delusion:  None  Memory:  Intact  Orientation:  Person, Place, Time and Situation  Reliability:  good  Insight:  Good  Judgement:  Good  Impulse Control:  Good  Physical/Medical Issues:  No     Assessment & Plan   Problems Addressed this Visit    None  Visit Diagnoses       Attention deficit hyperactivity disorder, combined type    -   Primary    Generalized anxiety disorder              Diagnoses         Codes Comments    Attention deficit hyperactivity disorder, combined type    -  Primary ICD-10-CM: F90.2  ICD-9-CM: 314.01     Generalized anxiety disorder     ICD-10-CM: F41.1  ICD-9-CM: 300.02           Kulwinder talbot.  UDS in past have been appropriate.  New narcotic agreement reviewed and signed.  Functionality: pt having minimal impairment in important areas of daily functioning.  Prognosis:  good dependent on medication/follow up and treatment plan compliance.    He is currently pleased with progress.  He is going to continue the Vyvanse for the ADHD. .  Continue the Lexapro for anxiety.  Refills have recently been submitted.  He will notify me when he needs them.    Continuing efforts to promote the therapeutic alliance, address the patient's issues, and strengthen self awareness, insights, and coping skills   Patient is agreeable to call the  Clinic with worsening symptoms.  .  Patient is aware to call 911 or go to the nearest ER should begin having SI/HI. RTC 16  weeks. Sooner if needed.

## 2024-04-27 DIAGNOSIS — F90.2 ATTENTION DEFICIT HYPERACTIVITY DISORDER, COMBINED TYPE: ICD-10-CM

## 2024-04-29 RX ORDER — LISDEXAMFETAMINE DIMESYLATE 40 MG/1
40 CAPSULE ORAL EVERY MORNING
Qty: 30 CAPSULE | Refills: 0 | Status: SHIPPED | OUTPATIENT
Start: 2024-04-29

## 2024-06-03 DIAGNOSIS — F90.2 ATTENTION DEFICIT HYPERACTIVITY DISORDER, COMBINED TYPE: ICD-10-CM

## 2024-06-03 RX ORDER — LISDEXAMFETAMINE DIMESYLATE 40 MG/1
40 CAPSULE ORAL EVERY MORNING
Qty: 30 CAPSULE | Refills: 0 | Status: SHIPPED | OUTPATIENT
Start: 2024-06-03

## 2024-07-09 DIAGNOSIS — F90.2 ATTENTION DEFICIT HYPERACTIVITY DISORDER, COMBINED TYPE: ICD-10-CM

## 2024-07-09 RX ORDER — LISDEXAMFETAMINE DIMESYLATE 40 MG/1
40 CAPSULE ORAL EVERY MORNING
Qty: 30 CAPSULE | Refills: 0 | Status: SHIPPED | OUTPATIENT
Start: 2024-07-09

## 2024-08-06 ENCOUNTER — TELEMEDICINE (OUTPATIENT)
Dept: PSYCHIATRY | Facility: CLINIC | Age: 24
End: 2024-08-06
Payer: COMMERCIAL

## 2024-08-06 DIAGNOSIS — F41.1 GENERALIZED ANXIETY DISORDER: ICD-10-CM

## 2024-08-06 DIAGNOSIS — F90.2 ATTENTION DEFICIT HYPERACTIVITY DISORDER, COMBINED TYPE: Primary | ICD-10-CM

## 2024-08-06 PROCEDURE — 99214 OFFICE O/P EST MOD 30 MIN: CPT | Performed by: NURSE PRACTITIONER

## 2024-08-06 RX ORDER — LISDEXAMFETAMINE DIMESYLATE 40 MG/1
40 CAPSULE ORAL EVERY MORNING
Qty: 30 CAPSULE | Refills: 0 | Status: SHIPPED | OUTPATIENT
Start: 2024-08-06

## 2024-08-06 NOTE — PROGRESS NOTES
Subjective   Andrew Martinez is a 24 y.o. male is here today for medication management follow-up.Pt presents for follow up at the [facility]  Chief Complaint: Recheck on ADHD  History of Present Illness:   Patient states that he is doing well.  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  Not for sure yet what he is going today for employment.  He is excited about all of this.  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  He denies any depression.  Denies excessive anxiety.  States the Vyvanse continues to help with focus and concentration.  He continues to take the Lexapro and states that he has plenty at home with some prescription that was filled at 1 point that is not showing in his chart.  Sleep is adequate.  Mood is stable.  No negative side effects to the meds.        The following portions of the patient's history were reviewed and updated as appropriate: allergies, current medications, past family history, past medical history, past social history, past surgical history and problem list.    Review of Systems   Constitutional:  Negative for activity change, appetite change and fatigue.   Eyes:  Negative for visual disturbance.   Respiratory: Negative.     Cardiovascular: Negative.    Gastrointestinal:  Negative for nausea.   Endocrine: Negative.    Genitourinary: Negative.    Musculoskeletal:  Negative for arthralgias.   Skin: Negative.    Allergic/Immunologic: Negative.    Neurological:  Negative for dizziness, seizures and headaches.   Hematological: Negative.    Psychiatric/Behavioral:  Negative for agitation, behavioral problems, confusion, decreased concentration, dysphoric mood, hallucinations, self-injury, sleep disturbance and suicidal ideas. The patient is nervous/anxious. The patient is not  hyperactive.      Reviewed copied data and there are no changes    Objective   Physical Exam   Constitutional: He is oriented to person, place, and time. He appears well-developed.   Neurological: He is alert and oriented to person, place, and time.   Psychiatric: His speech is normal and behavior is normal. Mood, judgment and thought content normal.   Pleasant and engaging   Vitals reviewed.    There were no vitals taken for this visit.    Medication List:   Current Outpatient Medications   Medication Sig Dispense Refill    lisdexamfetamine (Vyvanse) 40 MG capsule Take 1 capsule by mouth Every Morning. 30 capsule 0    Cholecalciferol (vitamin D3) 125 MCG (5000 UT) tablet tablet Take 1 tablet (5,000 Units total) by mouth daily. 90 tablet 1    Cholecalciferol 125 MCG (5000 UT) tablet Take 1 tablet by mouth Daily.      escitalopram (Lexapro) 10 MG tablet Take 1 tablet by mouth Daily. 90 tablet 0     No current facility-administered medications for this visit.     Reviewed copied data and there are no changes    Mental Status Exam:   Hygiene:   good  Cooperation:  Cooperative  Eye Contact:  Good  Psychomotor Behavior:  Appropriate  Affect:  Full range  Hopelessness: Denies  Speech:  Normal  Thought Process:  Goal directed  Thought Content:  Normal  Suicidal:  None  Homicidal:  None  Hallucinations:  None  Delusion:  None  Memory:  Intact  Orientation:  Person, Place, Time and Situation  Reliability:  good  Insight:  Good  Judgement:  Good  Impulse Control:  Good  Physical/Medical Issues:  No     Assessment & Plan   Problems Addressed this Visit    None  Visit Diagnoses       Attention deficit hyperactivity disorder, combined type    -  Primary    Relevant Medications    lisdexamfetamine (Vyvanse) 40 MG capsule    Generalized anxiety disorder        Relevant Medications    lisdexamfetamine (Vyvanse) 40 MG capsule          Diagnoses         Codes Comments    Attention deficit hyperactivity disorder, combined type    -   Primary ICD-10-CM: F90.2  ICD-9-CM: 314.01     Generalized anxiety disorder     ICD-10-CM: F41.1  ICD-9-CM: 300.02           Kulwinder talbot.  UDS in past have been appropriate.    Functionality: pt having minimal impairment in important areas of daily functioning.  Prognosis:  good dependent on medication/follow up and treatment plan compliance.    He is currently pleased with progress.  He is going to continue the Vyvanse for the ADHD. .  Continue the Lexapro for anxiety.  Refills have recently been submitted.  He will notify me when he needs them.    Continuing efforts to promote the therapeutic alliance, address the patient's issues, and strengthen self awareness, insights, and coping skills   Patient is agreeable to call the  Clinic with worsening symptoms.  .  Patient is aware to call 911 or go to the nearest ER should begin having SI/HI. RTC 12  weeks. Sooner if needed.

## 2024-08-13 DIAGNOSIS — F41.1 GENERALIZED ANXIETY DISORDER: ICD-10-CM

## 2024-08-13 RX ORDER — ESCITALOPRAM OXALATE 10 MG/1
10 TABLET ORAL DAILY
Qty: 90 TABLET | Refills: 0 | Status: SHIPPED | OUTPATIENT
Start: 2024-08-13 | End: 2025-08-13

## 2024-08-14 ENCOUNTER — TELEPHONE (OUTPATIENT)
Dept: FAMILY MEDICINE CLINIC | Facility: CLINIC | Age: 24
End: 2024-08-14
Payer: COMMERCIAL

## 2024-09-10 DIAGNOSIS — F90.2 ATTENTION DEFICIT HYPERACTIVITY DISORDER, COMBINED TYPE: ICD-10-CM

## 2024-09-10 RX ORDER — LISDEXAMFETAMINE DIMESYLATE 40 MG/1
40 CAPSULE ORAL EVERY MORNING
Qty: 30 CAPSULE | Refills: 0 | Status: SHIPPED | OUTPATIENT
Start: 2024-09-10

## 2024-10-13 DIAGNOSIS — F90.2 ATTENTION DEFICIT HYPERACTIVITY DISORDER, COMBINED TYPE: ICD-10-CM

## 2024-10-13 NOTE — TELEPHONE ENCOUNTER
RX REQUEST NEXT APPT  2/5/19   Bed/Stretcher in lowest position, wheels locked, appropriate side rails in place/Call bell, personal items and telephone in reach/Instruct patient to call for assistance before getting out of bed/chair/stretcher/Non-slip footwear applied when patient is off stretcher/[place] to call system/Physically safe environment - no spills, clutter or unnecessary equipment/Purposeful proactive rounding/Room/bathroom lighting operational, light cord in reach

## 2024-10-14 RX ORDER — LISDEXAMFETAMINE DIMESYLATE 40 MG/1
40 CAPSULE ORAL EVERY MORNING
Qty: 30 CAPSULE | Refills: 0 | Status: SHIPPED | OUTPATIENT
Start: 2024-10-14

## 2024-11-05 ENCOUNTER — TELEMEDICINE (OUTPATIENT)
Dept: PSYCHIATRY | Facility: CLINIC | Age: 24
End: 2024-11-05
Payer: COMMERCIAL

## 2024-11-05 DIAGNOSIS — F90.2 ATTENTION DEFICIT HYPERACTIVITY DISORDER, COMBINED TYPE: Primary | ICD-10-CM

## 2024-11-05 DIAGNOSIS — F41.1 GENERALIZED ANXIETY DISORDER: ICD-10-CM

## 2024-11-05 PROCEDURE — 99214 OFFICE O/P EST MOD 30 MIN: CPT | Performed by: NURSE PRACTITIONER

## 2024-11-05 RX ORDER — LISDEXAMFETAMINE DIMESYLATE 40 MG/1
40 CAPSULE ORAL EVERY MORNING
Qty: 30 CAPSULE | Refills: 0 | Status: SHIPPED | OUTPATIENT
Start: 2024-11-05

## 2024-11-05 RX ORDER — ESCITALOPRAM OXALATE 10 MG/1
10 TABLET ORAL DAILY
Qty: 90 TABLET | Refills: 0 | Status: SHIPPED | OUTPATIENT
Start: 2024-11-05 | End: 2025-11-05

## 2024-11-05 NOTE — PROGRESS NOTES
"      Subjective   Andrew Martinez is a 24 y.o. male is here today for medication management follow-up.“This provider is located at [facility], [address] [place] [address]. The provider identified herself as well as her credentials.   The Patient is located at home. The patient's condition being diagnosed/treated is appropriate for telemedicine. The patient gave consent to be seen remotely, and when consent is given they understand that the consent allows for patient identifiable information to be sent to a third party as needed.   They may refuse to be seen remotely at any time. The electronic data is encrypted and password protected, and the patient has been advised of the potential risks to privacy not withstanding such measures.   Chief Complaint: Recheck on ADHD  History of Present Illness:   Patient states he is doing well.  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  He has taken a new job.  He is not thrilled with a new job but needs to get some experience he states.  He plans on staying there for about a year than looking elsewhere.  He almost says he feels like he has \"hit a wall\".  He does not feel depressed but he just feels \"blah\".  Not having any anxiety.  The Vyvanse does continue to help with focus and concentration.  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  He denies any negative side effects to the meds.  Sleep is adequate.  Mood is stable no anger outbursts.  No medical stressors.  Denies any negative side effects to the meds.        The following portions of the patient's history were reviewed and updated as appropriate: allergies, current medications, past family history, past medical history, past social history, past surgical history and problem list.    Review of Systems   Constitutional:  Negative for activity change, appetite change and fatigue.   Eyes:  Negative " for visual disturbance.   Respiratory: Negative.     Cardiovascular: Negative.    Gastrointestinal:  Negative for nausea.   Endocrine: Negative.    Genitourinary: Negative.    Musculoskeletal:  Negative for arthralgias.   Skin: Negative.    Allergic/Immunologic: Negative.    Neurological:  Negative for dizziness, seizures and headaches.   Hematological: Negative.    Psychiatric/Behavioral:  Negative for agitation, behavioral problems, confusion, decreased concentration, dysphoric mood, hallucinations, self-injury, sleep disturbance and suicidal ideas. The patient is nervous/anxious. The patient is not hyperactive.      Reviewed copied data and there are no changes    Objective   Physical Exam   Constitutional: He is oriented to person, place, and time. He appears well-developed.   Neurological: He is alert and oriented to person, place, and time.   Psychiatric: His speech is normal and behavior is normal. Mood, judgment and thought content normal.   Pleasant and engaging   Vitals reviewed.    There were no vitals taken for this visit.    Medication List:   Current Outpatient Medications   Medication Sig Dispense Refill    escitalopram (Lexapro) 10 MG tablet Take 1 tablet by mouth Daily. 90 tablet 0    lisdexamfetamine (Vyvanse) 40 MG capsule Take 1 capsule by mouth Every Morning. 30 capsule 0    Cholecalciferol (vitamin D3) 125 MCG (5000 UT) tablet tablet Take 1 tablet (5,000 Units total) by mouth daily. 90 tablet 1    Cholecalciferol 125 MCG (5000 UT) tablet Take 1 tablet by mouth Daily.       No current facility-administered medications for this visit.     Reviewed copied data and there are no changes    Mental Status Exam:   Hygiene:   good  Cooperation:  Cooperative  Eye Contact:  Good  Psychomotor Behavior:  Appropriate  Affect:  Full range  Hopelessness: Denies  Speech:  Normal  Thought Process:  Goal directed  Thought Content:  Normal  Suicidal:  None  Homicidal:  None  Hallucinations:  None  Delusion:   None  Memory:  Intact  Orientation:  Person, Place, Time and Situation  Reliability:  good  Insight:  Good  Judgement:  Good  Impulse Control:  Good  Physical/Medical Issues:  No     Assessment & Plan   Problems Addressed this Visit    None  Visit Diagnoses       Attention deficit hyperactivity disorder, combined type    -  Primary    Relevant Medications    escitalopram (Lexapro) 10 MG tablet    lisdexamfetamine (Vyvanse) 40 MG capsule    Generalized anxiety disorder        Relevant Medications    escitalopram (Lexapro) 10 MG tablet    lisdexamfetamine (Vyvanse) 40 MG capsule          Diagnoses         Codes Comments    Attention deficit hyperactivity disorder, combined type    -  Primary ICD-10-CM: F90.2  ICD-9-CM: 314.01     Generalized anxiety disorder     ICD-10-CM: F41.1  ICD-9-CM: 300.02           Kulwinder talbot.  UDS in past have been appropriate.    Functionality: pt having minimal impairment in important areas of daily functioning.  Prognosis:  good dependent on medication/follow up and treatment plan compliance.    He had a really lengthy discussion and we talked about how ADHD causes your mind to never shut down and he was so busy with work, school, parenting that his mind was constantly being filled with things to do and now that the stress of school is over this may be why he is feeling so indifferent.  He agrees with this.  I do not really feel like we need to make any medication change right now as Lexapro has worked well for him for quite some time and he agrees with this.  He is going to continue the Vyvanse for the ADHD. .  Continue the Lexapro for anxiety.  Refills have been submitted.  He does state that should his feelings turn into worsening depression he will let me know and I will see him back sooner than 3 months.    Continuing efforts to promote the therapeutic alliance, address the patient's issues, and strengthen self awareness, insights, and coping skills   Patient is agreeable to call  the  Clinic with worsening symptoms.  .  Patient is aware to call 911 or go to the nearest ER should begin having SI/HI. RTC 12  weeks. Sooner if needed.  His next visit will be in person.

## 2024-12-29 DIAGNOSIS — F90.2 ATTENTION DEFICIT HYPERACTIVITY DISORDER, COMBINED TYPE: ICD-10-CM

## 2024-12-30 RX ORDER — LISDEXAMFETAMINE DIMESYLATE 40 MG/1
40 CAPSULE ORAL EVERY MORNING
Qty: 30 CAPSULE | Refills: 0 | Status: SHIPPED | OUTPATIENT
Start: 2024-12-30

## 2025-02-05 ENCOUNTER — OFFICE VISIT (OUTPATIENT)
Dept: PSYCHIATRY | Facility: CLINIC | Age: 25
End: 2025-02-05
Payer: COMMERCIAL

## 2025-02-05 VITALS
SYSTOLIC BLOOD PRESSURE: 113 MMHG | BODY MASS INDEX: 28.66 KG/M2 | HEIGHT: 70 IN | DIASTOLIC BLOOD PRESSURE: 76 MMHG | OXYGEN SATURATION: 98 % | HEART RATE: 87 BPM | WEIGHT: 200.2 LBS

## 2025-02-05 DIAGNOSIS — F41.1 GENERALIZED ANXIETY DISORDER: ICD-10-CM

## 2025-02-05 DIAGNOSIS — F90.2 ATTENTION DEFICIT HYPERACTIVITY DISORDER, COMBINED TYPE: Primary | ICD-10-CM

## 2025-02-05 PROCEDURE — 99214 OFFICE O/P EST MOD 30 MIN: CPT | Performed by: NURSE PRACTITIONER

## 2025-02-05 RX ORDER — LISDEXAMFETAMINE DIMESYLATE 40 MG/1
40 CAPSULE ORAL EVERY MORNING
Qty: 30 CAPSULE | Refills: 0 | Status: SHIPPED | OUTPATIENT
Start: 2025-02-05

## 2025-02-05 RX ORDER — ESCITALOPRAM OXALATE 10 MG/1
10 TABLET ORAL DAILY
Qty: 90 TABLET | Refills: 0 | Status: SHIPPED | OUTPATIENT
Start: 2025-02-05 | End: 2026-02-05

## 2025-02-05 NOTE — PROGRESS NOTES
Subjective   Andrew Martinez is a 24 y.o. male is here today for medication management follow-up.  Chief Complaint: Recheck on ADHD  History of Present Illness:   History of Present Illness  The patient presents for evaluation of ADHD.    Patient states that he is doing well.  He continues to work full-time.  Still currently looking for another job but is satisfied currently.  He states the stimulant continues to help with focus and concentration as well as completion of tasks.  The Lexapro continues to help with anxiety.  He rates it about a 3 out of 10 with 10 being severe.  Denies any depression.  Mood is stable.  Sleeping well at night without difficulty.  Denies any negative side effects to meds.Body mass index is 28.73 kg/m².  Appetite is good.  No medical stressors.  His girlfriend is pregnant currently and possibly expecting twins.  PHQ-2 Depression Screening  Little interest or pleasure in doing things?     Feeling down, depressed, or hopeless?     PHQ-2 Total Score                  The following portions of the patient's history were reviewed and updated as appropriate: allergies, current medications, past family history, past medical history, past social history, past surgical history and problem list.    Review of Systems   Constitutional:  Negative for activity change, appetite change and fatigue.   Eyes:  Negative for visual disturbance.   Respiratory: Negative.     Cardiovascular: Negative.    Gastrointestinal:  Negative for nausea.   Endocrine: Negative.    Genitourinary: Negative.    Musculoskeletal:  Negative for arthralgias.   Skin: Negative.    Allergic/Immunologic: Negative.    Neurological:  Negative for dizziness, seizures and headaches.   Hematological: Negative.    Psychiatric/Behavioral:  Negative for agitation, behavioral problems, confusion, decreased concentration, dysphoric mood, hallucinations, self-injury, sleep disturbance and suicidal ideas. The patient is  "nervous/anxious. The patient is not hyperactive.      Reviewed copied data and there are no changes    Objective   Physical Exam   Constitutional: He is oriented to person, place, and time. He appears well-developed.   Neurological: He is alert and oriented to person, place, and time.   Psychiatric: His speech is normal and behavior is normal. Mood, judgment and thought content normal.   Pleasant and engaging   Vitals reviewed.    Blood pressure 113/76, pulse 87, height 177.8 cm (70\"), weight 90.8 kg (200 lb 3.2 oz), SpO2 98%.    Medication List:   Current Outpatient Medications   Medication Sig Dispense Refill    escitalopram (Lexapro) 10 MG tablet Take 1 tablet by mouth Daily. 90 tablet 0    lisdexamfetamine (Vyvanse) 40 MG capsule Take 1 capsule by mouth Every Morning. 30 capsule 0    Cholecalciferol (vitamin D3) 125 MCG (5000 UT) tablet tablet Take 1 tablet (5,000 Units total) by mouth daily. 90 tablet 1    Cholecalciferol 125 MCG (5000 UT) tablet Take 1 tablet by mouth Daily.       No current facility-administered medications for this visit.     Reviewed copied data and there are no changes    Mental Status Exam:   Hygiene:   good  Cooperation:  Cooperative  Eye Contact:  Good  Psychomotor Behavior:  Appropriate  Affect:  Full range  Hopelessness: Denies  Speech:  Normal  Thought Process:  Goal directed  Thought Content:  Normal  Suicidal:  None  Homicidal:  None  Hallucinations:  None  Delusion:  None  Memory:  Intact  Orientation:  Person, Place, Time and Situation  Reliability:  good  Insight:  Good  Judgement:  Good  Impulse Control:  Good  Physical/Medical Issues:  No     Assessment & Plan   Problems Addressed this Visit    None  Visit Diagnoses       Attention deficit hyperactivity disorder, combined type    -  Primary    Relevant Medications    escitalopram (Lexapro) 10 MG tablet    lisdexamfetamine (Vyvanse) 40 MG capsule    Generalized anxiety disorder        Relevant Medications    escitalopram " (Lexapro) 10 MG tablet    lisdexamfetamine (Vyvanse) 40 MG capsule          Diagnoses         Codes Comments    Attention deficit hyperactivity disorder, combined type    -  Primary ICD-10-CM: F90.2  ICD-9-CM: 314.01     Generalized anxiety disorder     ICD-10-CM: F41.1  ICD-9-CM: 300.02           Kulwinder talbot.  UDS in past have been appropriate.  UDS performed today and pending   functionality: pt having minimal impairment in important areas of daily functioning.  Prognosis:  good dependent on medication/follow up and treatment plan compliance.    He is pleasantly [place] with his progress.  He will continue the Lexapro for the generalized anxiety disorder and continue the Vyvanse for the ADHD.  Refills of been submitted.  Continuing efforts to promote the therapeutic alliance, address the patient's issues, and strengthen self awareness, insights, and coping skills   Patient is agreeable to call the  Clinic with worsening symptoms.  .  Patient is aware to call 911 or go to the nearest ER should begin having SI/HI. RTC 12  weeks. Sooner if needed.

## 2025-03-13 DIAGNOSIS — F90.2 ATTENTION DEFICIT HYPERACTIVITY DISORDER, COMBINED TYPE: ICD-10-CM

## 2025-03-13 RX ORDER — LISDEXAMFETAMINE DIMESYLATE 40 MG/1
40 CAPSULE ORAL EVERY MORNING
Qty: 30 CAPSULE | Refills: 0 | Status: SHIPPED | OUTPATIENT
Start: 2025-03-13

## 2025-04-20 DIAGNOSIS — F90.2 ATTENTION DEFICIT HYPERACTIVITY DISORDER, COMBINED TYPE: ICD-10-CM

## 2025-04-21 RX ORDER — LISDEXAMFETAMINE DIMESYLATE 40 MG/1
40 CAPSULE ORAL EVERY MORNING
Qty: 30 CAPSULE | Refills: 0 | Status: SHIPPED | OUTPATIENT
Start: 2025-04-21

## 2025-05-05 ENCOUNTER — OFFICE VISIT (OUTPATIENT)
Dept: PSYCHIATRY | Facility: CLINIC | Age: 25
End: 2025-05-05
Payer: COMMERCIAL

## 2025-05-05 VITALS
SYSTOLIC BLOOD PRESSURE: 125 MMHG | WEIGHT: 200 LBS | BODY MASS INDEX: 28.63 KG/M2 | OXYGEN SATURATION: 98 % | HEIGHT: 70 IN | HEART RATE: 88 BPM | DIASTOLIC BLOOD PRESSURE: 85 MMHG

## 2025-05-05 DIAGNOSIS — F90.2 ATTENTION DEFICIT HYPERACTIVITY DISORDER, COMBINED TYPE: Primary | ICD-10-CM

## 2025-05-05 DIAGNOSIS — F41.1 GENERALIZED ANXIETY DISORDER: ICD-10-CM

## 2025-05-05 PROCEDURE — 99214 OFFICE O/P EST MOD 30 MIN: CPT | Performed by: NURSE PRACTITIONER

## 2025-05-05 RX ORDER — ESCITALOPRAM OXALATE 10 MG/1
10 TABLET ORAL DAILY
Qty: 90 TABLET | Refills: 0 | Status: SHIPPED | OUTPATIENT
Start: 2025-05-05 | End: 2026-05-05

## 2025-05-05 NOTE — PROGRESS NOTES
Subjective   Andrew Martinez is a 24 y.o. male is here today for medication management follow-up.  Chief Complaint: Recheck on ADHD  History of Present Illness:   History of Present Illness  The patient presents for evaluation of ADHD.    The patient is a 24-year-old male who presents for evaluation of adhd and anxiety.    Interim History: He has been managing his adhd and anxiety effectively, even with the added stress of a new job. The stimulant continues to help with focus and concentration.  Anxiety is present but manageable ;with lexaPRO.  NO negative side effects to the meds.  Mood is stable.  Sleep is adequate.  Has new baby coming this August.  PHQ-2 Depression Screening  Little interest or pleasure in doing things? Not at all   Feeling down, depressed, or hopeless? 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    SOCIAL HISTORY  He is currently employed as a clinical .         The following portions of the patient's history were reviewed and updated as appropriate: allergies, current medications, past family history, past medical history, past social history, past surgical history and problem list.    Review of Systems   Constitutional:  Negative for activity change, appetite change and fatigue.   Eyes:  Negative for visual disturbance.   Respiratory: Negative.     Cardiovascular: Negative.    Gastrointestinal:  Negative for nausea.   Endocrine: Negative.    Genitourinary: Negative.   "  Musculoskeletal:  Negative for arthralgias.   Skin: Negative.    Allergic/Immunologic: Negative.    Neurological:  Negative for dizziness, seizures and headaches.   Hematological: Negative.    Psychiatric/Behavioral:  Negative for agitation, behavioral problems, confusion, decreased concentration, dysphoric mood, hallucinations, self-injury, sleep disturbance and suicidal ideas. The patient is nervous/anxious. The patient is not hyperactive.      Reviewed copied data and there are no changes    Objective   Physical Exam   Constitutional: He is oriented to person, place, and time. He appears well-developed.   Neurological: He is alert and oriented to person, place, and time.   Psychiatric: His speech is normal and behavior is normal. Mood, judgment and thought content normal.   Pleasant and engaging   Vitals reviewed.    Blood pressure 125/85, pulse 88, height 177.8 cm (70\"), weight 90.7 kg (200 lb), SpO2 98%.    Medication List:   Current Outpatient Medications   Medication Sig Dispense Refill    escitalopram (Lexapro) 10 MG tablet Take 1 tablet by mouth Daily. 90 tablet 0    Cholecalciferol (vitamin D3) 125 MCG (5000 UT) tablet tablet Take 1 tablet (5,000 Units total) by mouth daily. 90 tablet 1    Cholecalciferol 125 MCG (5000 UT) tablet Take 1 tablet by mouth Daily.      lisdexamfetamine (Vyvanse) 40 MG capsule Take 1 capsule by mouth Every Morning. 30 capsule 0     No current facility-administered medications for this visit.     Reviewed copied data and there are no changes    Mental Status Exam:   Hygiene:   good  Cooperation:  Cooperative  Eye Contact:  Good  Psychomotor Behavior:  Appropriate  Affect:  Full range  Hopelessness: Denies  Speech:  Normal  Thought Process:  Goal directed  Thought Content:  Normal  Suicidal:  None  Homicidal:  None  Hallucinations:  None  Delusion:  None  Memory:  Intact  Orientation:  Person, Place, Time and Situation  Reliability:  good  Insight:  Good  Judgement:  " Good  Impulse Control:  Good  Physical/Medical Issues:  No     Assessment & Plan   Problems Addressed this Visit    None  Visit Diagnoses         Attention deficit hyperactivity disorder, combined type    -  Primary    Relevant Medications    escitalopram (Lexapro) 10 MG tablet      Generalized anxiety disorder        Relevant Medications    escitalopram (Lexapro) 10 MG tablet          Diagnoses         Codes Comments      Attention deficit hyperactivity disorder, combined type    -  Primary ICD-10-CM: F90.2  ICD-9-CM: 314.01       Generalized anxiety disorder     ICD-10-CM: F41.1  ICD-9-CM: 300.02           Kulwinder talbot.  UDS in past have been appropriate.   functionality: pt having minimal impairment in important areas of daily functioning.  Prognosis:  good dependent on medication/follow up and treatment plan compliance.  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 He expressed satisfaction with his role but acknowledged the workload and the complexity of the tasks involved. The conversation also touched on the challenges of medication-assisted treatment (MAT) policies, particularly regarding benzodiazepines and amphetamines, and the difficulties in managing patient compliance and harm reduction.    Clinical Impression:  The patient appears to be managing his depression and anxiety well despite the increased stress from his new job. He reports no significant issues with either condition. His mood and affect were appropriate throughout the session, and he demonstrated insight into the complexities of his work and the importance of proper medication management. There were no signs of exacerbation of depressive or anxiety symptoms.    Therapeutic Intervention:  Cognitive-behavioral strategies were employed to  help the patient reframe his thoughts about the stress of his new job and to explore feelings related to his responsibilities. Psychoeducation was provided regarding the importance of compliance with MAT policies and the potential risks associated with benzodiazepine and amphetamine use. Mindfulness exercises were suggested to help manage stress.    Plan:  - Continue Lexapro as prescribed.  - Engage in regular mindfulness exercises to manage stress.  - Monitor workload and seek support if feeling overwhelmed.      Follow-up:  - Next appointment scheduled for 06/2025.  - Goals for the next session: Review stress management techniques and assess the impact of job responsibilities on mental health.    Notes & Risk Factors:  - No current risk factors for harm to self or others.  - Protective factors include job satisfaction and insight into medication management.       He is pleasantly pleased with his progress.  He will continue the Lexapro for the generalized anxiety disorder and continue the Vyvanse for the ADHD.  Refills of been submitted.  Continuing efforts to promote the therapeutic alliance, address the patient's issues, and strengthen self awareness, insights, and coping skills   Patient is agreeable to call the  Clinic with worsening symptoms.  .  Patient is aware to call 911 or go to the nearest ER should begin having SI/HI. RTC 12  weeks. Sooner if needed.

## 2025-05-22 DIAGNOSIS — F90.2 ATTENTION DEFICIT HYPERACTIVITY DISORDER, COMBINED TYPE: ICD-10-CM

## 2025-05-22 RX ORDER — LISDEXAMFETAMINE DIMESYLATE 40 MG/1
40 CAPSULE ORAL EVERY MORNING
Qty: 30 CAPSULE | Refills: 0 | Status: SHIPPED | OUTPATIENT
Start: 2025-05-22

## 2025-07-01 DIAGNOSIS — F90.2 ATTENTION DEFICIT HYPERACTIVITY DISORDER, COMBINED TYPE: ICD-10-CM

## 2025-07-01 RX ORDER — LISDEXAMFETAMINE DIMESYLATE 40 MG/1
40 CAPSULE ORAL EVERY MORNING
Qty: 30 CAPSULE | Refills: 0 | Status: SHIPPED | OUTPATIENT
Start: 2025-07-01

## 2025-07-29 DIAGNOSIS — F41.1 GENERALIZED ANXIETY DISORDER: ICD-10-CM

## 2025-07-29 DIAGNOSIS — F90.2 ATTENTION DEFICIT HYPERACTIVITY DISORDER, COMBINED TYPE: ICD-10-CM

## 2025-07-29 RX ORDER — ESCITALOPRAM OXALATE 10 MG/1
10 TABLET ORAL DAILY
Qty: 90 TABLET | Refills: 0 | Status: SHIPPED | OUTPATIENT
Start: 2025-07-29 | End: 2026-07-29

## 2025-07-29 RX ORDER — LISDEXAMFETAMINE DIMESYLATE 40 MG/1
40 CAPSULE ORAL EVERY MORNING
Qty: 30 CAPSULE | Refills: 0 | Status: SHIPPED | OUTPATIENT
Start: 2025-07-29